# Patient Record
Sex: MALE | Race: WHITE | Employment: OTHER | URBAN - METROPOLITAN AREA
[De-identification: names, ages, dates, MRNs, and addresses within clinical notes are randomized per-mention and may not be internally consistent; named-entity substitution may affect disease eponyms.]

---

## 2018-06-26 ENCOUNTER — OFFICE VISIT (OUTPATIENT)
Dept: PULMONOLOGY | Age: 58
End: 2018-06-26

## 2018-06-26 VITALS
RESPIRATION RATE: 16 BRPM | BODY MASS INDEX: 28.35 KG/M2 | HEART RATE: 87 BPM | OXYGEN SATURATION: 99 % | DIASTOLIC BLOOD PRESSURE: 78 MMHG | HEIGHT: 75 IN | SYSTOLIC BLOOD PRESSURE: 125 MMHG | WEIGHT: 228 LBS | TEMPERATURE: 97.2 F

## 2018-06-26 DIAGNOSIS — G47.10 HYPERSOMNIA: ICD-10-CM

## 2018-06-26 DIAGNOSIS — M26.19 RETROGNATHIA: ICD-10-CM

## 2018-06-26 DIAGNOSIS — G47.33 MODERATE OBSTRUCTIVE SLEEP APNEA: Primary | ICD-10-CM

## 2018-06-26 DIAGNOSIS — R53.83 FATIGUE, UNSPECIFIED TYPE: ICD-10-CM

## 2018-06-26 PROCEDURE — 99203 OFFICE O/P NEW LOW 30 MIN: CPT | Performed by: INTERNAL MEDICINE

## 2018-06-26 ASSESSMENT — SLEEP AND FATIGUE QUESTIONNAIRES
HOW LIKELY ARE YOU TO NOD OFF OR FALL ASLEEP WHILE SITTING QUIETLY AFTER LUNCH WITHOUT ALCOHOL: 0
HOW LIKELY ARE YOU TO NOD OFF OR FALL ASLEEP WHILE LYING DOWN TO REST IN THE AFTERNOON WHEN CIRCUMSTANCES PERMIT: 3
HOW LIKELY ARE YOU TO NOD OFF OR FALL ASLEEP IN A CAR, WHILE STOPPED FOR A FEW MINUTES IN TRAFFIC: 0
HOW LIKELY ARE YOU TO NOD OFF OR FALL ASLEEP WHEN YOU ARE A PASSENGER IN A CAR FOR AN HOUR WITHOUT A BREAK: 0
NECK CIRCUMFERENCE (INCHES): 16.75
HOW LIKELY ARE YOU TO NOD OFF OR FALL ASLEEP WHILE WATCHING TV: 0
HOW LIKELY ARE YOU TO NOD OFF OR FALL ASLEEP WHILE SITTING INACTIVE IN A PUBLIC PLACE: 1
ESS TOTAL SCORE: 4
HOW LIKELY ARE YOU TO NOD OFF OR FALL ASLEEP WHILE SITTING AND READING: 0
HOW LIKELY ARE YOU TO NOD OFF OR FALL ASLEEP WHILE SITTING AND TALKING TO SOMEONE: 0

## 2018-08-14 ENCOUNTER — OFFICE VISIT (OUTPATIENT)
Dept: PULMONOLOGY | Age: 58
End: 2018-08-14

## 2018-08-14 VITALS
HEART RATE: 68 BPM | DIASTOLIC BLOOD PRESSURE: 70 MMHG | SYSTOLIC BLOOD PRESSURE: 108 MMHG | WEIGHT: 223.8 LBS | HEIGHT: 75 IN | BODY MASS INDEX: 27.83 KG/M2 | RESPIRATION RATE: 21 BRPM | OXYGEN SATURATION: 98 %

## 2018-08-14 DIAGNOSIS — M26.19 RETROGNATHIA: ICD-10-CM

## 2018-08-14 DIAGNOSIS — R68.2 DRY MOUTH: ICD-10-CM

## 2018-08-14 DIAGNOSIS — G47.33 MODERATE OBSTRUCTIVE SLEEP APNEA: Primary | ICD-10-CM

## 2018-08-14 PROCEDURE — 99214 OFFICE O/P EST MOD 30 MIN: CPT | Performed by: INTERNAL MEDICINE

## 2018-08-14 ASSESSMENT — SLEEP AND FATIGUE QUESTIONNAIRES
ESS TOTAL SCORE: 1
NECK CIRCUMFERENCE (INCHES): 16
HOW LIKELY ARE YOU TO NOD OFF OR FALL ASLEEP WHILE SITTING INACTIVE IN A PUBLIC PLACE: 0
HOW LIKELY ARE YOU TO NOD OFF OR FALL ASLEEP IN A CAR, WHILE STOPPED FOR A FEW MINUTES IN TRAFFIC: 0
HOW LIKELY ARE YOU TO NOD OFF OR FALL ASLEEP WHEN YOU ARE A PASSENGER IN A CAR FOR AN HOUR WITHOUT A BREAK: 0
HOW LIKELY ARE YOU TO NOD OFF OR FALL ASLEEP WHILE LYING DOWN TO REST IN THE AFTERNOON WHEN CIRCUMSTANCES PERMIT: 1
HOW LIKELY ARE YOU TO NOD OFF OR FALL ASLEEP WHILE SITTING QUIETLY AFTER LUNCH WITHOUT ALCOHOL: 0
HOW LIKELY ARE YOU TO NOD OFF OR FALL ASLEEP WHILE SITTING AND TALKING TO SOMEONE: 0
HOW LIKELY ARE YOU TO NOD OFF OR FALL ASLEEP WHILE SITTING AND READING: 0
HOW LIKELY ARE YOU TO NOD OFF OR FALL ASLEEP WHILE WATCHING TV: 0

## 2018-08-14 NOTE — PATIENT INSTRUCTIONS
enough blankets to stay warm is recommended. If your room is too noisy, try a white noise machine. If too bright, try black out shades or an eye mask. Dont take worries to bed. Leave worries about work, school etc. behind you when you go to bed. Some people find it helpful to assign a worry period in the evening or late afternoon to write down your worries and get them out of your system. CPAP Equipment Cleaning and Disinfecting Schedule  Equipment Cleaning Frequency Instructions  Disinfecting Frequency   Non-Disposable Filters  Weekly Mild soapy water, Rinse, Air Dry Not Required   Disposable Filters Change as needed  2-4 weeks Do Not Wash Not Required   Hose/tubing Daily Mild soapy water, Rinse, Air Dry Once a week   Mask / Nasal Pillows Daily Mild soapy water, Rinse, Air Dry Once a week   Headgear Weekly Hand wash, Mild soapy water, Rinse, Dry  Not Required   Humidifier Daily Empty water daily  Mild soapy water, Rinse well, Air Dry  Once a week   CPAP Unit As Needed Dust with damp cloth,  No detergents or sprays Not Required         Disinfect (per schedule) with 1 part white vinegar and 3 parts water- soak mask and water chamber for 30 minutes every 1-2 weeks, more often if sick. Allow water/vinegar mixture to run through tubing. Allow all equipment to air dry. Drying Hints:   Always hang tubing away from direct sunlight, as this will cause the tubing to become yellow, brittle and crack over a period of time. DO NOT attach the wet tubing to your CPAP unit to blow-dry it. The moisture from the tubing can drain back into your machine. Moisture in your unit can cause sudden pressure increases or short circuits  DO's and DON'Ts:  - Don't use alcohol-based products to clean your mask, because it can cause the materials to become hard and brittle.    - Don't put headgear in the washer or dryer  - Don't use any caustic or household cleaning solutions such as bleach on your CPAP   equipment.  - Do follow the

## 2018-08-14 NOTE — PROGRESS NOTES
· Add chin strap   · Increased humidification   · Advised to use CPAP 6-8 hrs at night and during naps. · Replacement of mask, tubing, head straps every 3-6 months or sooner if damaged. · Follow up CPAP compliance and pressure adjustment if needed  · Sleep hygiene  · Avoid sedatives, alcohol and caffeinated drinks at bed time. · No driving motorized vehicles or operating heavy machinery while fatigue, drowsy or sleepy.

## 2019-07-24 ENCOUNTER — OFFICE VISIT (OUTPATIENT)
Dept: FAMILY MEDICINE CLINIC | Age: 59
End: 2019-07-24
Payer: COMMERCIAL

## 2019-07-24 VITALS
TEMPERATURE: 97.6 F | WEIGHT: 195.2 LBS | HEART RATE: 74 BPM | SYSTOLIC BLOOD PRESSURE: 132 MMHG | OXYGEN SATURATION: 99 % | DIASTOLIC BLOOD PRESSURE: 84 MMHG | BODY MASS INDEX: 24.4 KG/M2

## 2019-07-24 DIAGNOSIS — B02.9 HERPES ZOSTER WITHOUT COMPLICATION: Primary | ICD-10-CM

## 2019-07-24 PROCEDURE — 99203 OFFICE O/P NEW LOW 30 MIN: CPT | Performed by: NURSE PRACTITIONER

## 2019-07-24 RX ORDER — GABAPENTIN 100 MG/1
100 CAPSULE ORAL 3 TIMES DAILY
Qty: 90 CAPSULE | Refills: 0 | Status: SHIPPED | OUTPATIENT
Start: 2019-07-24 | End: 2020-10-23

## 2019-07-24 RX ORDER — VALACYCLOVIR HYDROCHLORIDE 1 G/1
1000 TABLET, FILM COATED ORAL 3 TIMES DAILY
Qty: 21 TABLET | Refills: 0 | Status: SHIPPED | OUTPATIENT
Start: 2019-07-24 | End: 2019-08-01 | Stop reason: SDUPTHER

## 2019-07-24 ASSESSMENT — ENCOUNTER SYMPTOMS
RHINORRHEA: 0
COLOR CHANGE: 1
ROS SKIN COMMENTS: BLISTERS
COUGH: 0
DIARRHEA: 0
EYE DISCHARGE: 0
CHEST TIGHTNESS: 0
SORE THROAT: 0
SHORTNESS OF BREATH: 0

## 2019-07-24 NOTE — PROGRESS NOTES
meetings of clubs or organizations: Not on file     Relationship status: Not on file    Intimate partner violence:     Fear of current or ex partner: Not on file     Emotionally abused: Not on file     Physically abused: Not on file     Forced sexual activity: Not on file   Other Topics Concern    Not on file   Social History Narrative    ** Merged History Encounter **             Family History   Problem Relation Age of Onset    Cancer Mother         breast       Vitals:    07/24/19 1607   BP: 132/84   Pulse: 74   Temp: 97.6 °F (36.4 °C)   TempSrc: Oral   SpO2: 99%   Weight: 195 lb 3.2 oz (88.5 kg)     Estimated body mass index is 24.4 kg/m² as calculated from the following:    Height as of 8/14/18: 6' 3\" (1.905 m). Weight as of this encounter: 195 lb 3.2 oz (88.5 kg). Physical Exam   Constitutional: He is oriented to person, place, and time. He appears well-developed and well-nourished. No distress. HENT:   Head: Normocephalic and atraumatic. Right Ear: External ear normal.   Left Ear: External ear normal.   Eyes: Pupils are equal, round, and reactive to light. Conjunctivae are normal. Right eye exhibits no discharge. Left eye exhibits no discharge. Neck: Normal range of motion. Neck supple. Cardiovascular: Normal rate, regular rhythm and normal heart sounds. Pulmonary/Chest: Effort normal and breath sounds normal. No respiratory distress. Abdominal: Soft. Musculoskeletal: Normal range of motion. He exhibits no deformity. Neurological: He is alert and oriented to person, place, and time. Skin: Skin is warm and dry. He is not diaphoretic. There is erythema. Erythema, fluid filled pustules on left side of face, neck, head   Psychiatric: He has a normal mood and affect. His behavior is normal. Judgment and thought content normal.   Vitals reviewed. ASSESSMENT/PLAN:  [place] was seen today for herpes zoster.     Diagnoses and all orders for this visit:    Herpes zoster without

## 2019-08-01 DIAGNOSIS — B02.9 HERPES ZOSTER WITHOUT COMPLICATION: ICD-10-CM

## 2019-08-02 RX ORDER — VALACYCLOVIR HYDROCHLORIDE 1 G/1
TABLET, FILM COATED ORAL
Qty: 21 TABLET | Refills: 0 | Status: SHIPPED | OUTPATIENT
Start: 2019-08-02 | End: 2020-10-23

## 2019-08-08 ENCOUNTER — TELEPHONE (OUTPATIENT)
Dept: FAMILY MEDICINE CLINIC | Age: 59
End: 2019-08-08

## 2019-08-08 ENCOUNTER — OFFICE VISIT (OUTPATIENT)
Dept: FAMILY MEDICINE CLINIC | Age: 59
End: 2019-08-08
Payer: COMMERCIAL

## 2019-08-08 VITALS
WEIGHT: 195.8 LBS | OXYGEN SATURATION: 98 % | TEMPERATURE: 97.9 F | RESPIRATION RATE: 16 BRPM | DIASTOLIC BLOOD PRESSURE: 88 MMHG | SYSTOLIC BLOOD PRESSURE: 132 MMHG | HEIGHT: 75 IN | HEART RATE: 76 BPM | BODY MASS INDEX: 24.34 KG/M2

## 2019-08-08 DIAGNOSIS — H61.22 IMPACTED CERUMEN OF LEFT EAR: ICD-10-CM

## 2019-08-08 DIAGNOSIS — Z00.00 HEALTHCARE MAINTENANCE: Primary | ICD-10-CM

## 2019-08-08 PROCEDURE — 99396 PREV VISIT EST AGE 40-64: CPT | Performed by: FAMILY MEDICINE

## 2019-08-08 ASSESSMENT — PATIENT HEALTH QUESTIONNAIRE - PHQ9
1. LITTLE INTEREST OR PLEASURE IN DOING THINGS: 0
SUM OF ALL RESPONSES TO PHQ QUESTIONS 1-9: 0
SUM OF ALL RESPONSES TO PHQ QUESTIONS 1-9: 0
SUM OF ALL RESPONSES TO PHQ9 QUESTIONS 1 & 2: 0
2. FEELING DOWN, DEPRESSED OR HOPELESS: 0

## 2019-08-26 ENCOUNTER — NURSE ONLY (OUTPATIENT)
Dept: FAMILY MEDICINE CLINIC | Age: 59
End: 2019-08-26
Payer: COMMERCIAL

## 2019-08-26 DIAGNOSIS — Z00.00 HEALTHCARE MAINTENANCE: ICD-10-CM

## 2019-08-26 LAB
ANION GAP SERPL CALCULATED.3IONS-SCNC: 13 MMOL/L (ref 3–16)
BUN BLDV-MCNC: 18 MG/DL (ref 7–20)
CALCIUM SERPL-MCNC: 9.4 MG/DL (ref 8.3–10.6)
CHLORIDE BLD-SCNC: 102 MMOL/L (ref 99–110)
CHOLESTEROL, TOTAL: 156 MG/DL (ref 0–199)
CO2: 27 MMOL/L (ref 21–32)
CREAT SERPL-MCNC: 0.9 MG/DL (ref 0.9–1.3)
GFR AFRICAN AMERICAN: >60
GFR NON-AFRICAN AMERICAN: >60
GLUCOSE BLD-MCNC: 89 MG/DL (ref 70–99)
HDLC SERPL-MCNC: 79 MG/DL (ref 40–60)
LDL CHOLESTEROL CALCULATED: 68 MG/DL
POTASSIUM SERPL-SCNC: 4.5 MMOL/L (ref 3.5–5.1)
SODIUM BLD-SCNC: 142 MMOL/L (ref 136–145)
TRIGL SERPL-MCNC: 44 MG/DL (ref 0–150)
VLDLC SERPL CALC-MCNC: 9 MG/DL

## 2019-08-26 PROCEDURE — 36415 COLL VENOUS BLD VENIPUNCTURE: CPT | Performed by: FAMILY MEDICINE

## 2019-08-27 LAB
ESTIMATED AVERAGE GLUCOSE: 96.8 MG/DL
HBA1C MFR BLD: 5 %

## 2019-09-25 ENCOUNTER — TELEPHONE (OUTPATIENT)
Dept: PULMONOLOGY | Age: 59
End: 2019-09-25

## 2019-11-05 ENCOUNTER — OFFICE VISIT (OUTPATIENT)
Dept: ORTHOPEDIC SURGERY | Age: 59
End: 2019-11-05
Payer: COMMERCIAL

## 2019-11-05 VITALS — HEIGHT: 75 IN | WEIGHT: 195.77 LBS | BODY MASS INDEX: 24.34 KG/M2

## 2019-11-05 DIAGNOSIS — R52 PAIN: Primary | ICD-10-CM

## 2019-11-05 PROCEDURE — 99243 OFF/OP CNSLTJ NEW/EST LOW 30: CPT | Performed by: ORTHOPAEDIC SURGERY

## 2019-11-05 RX ORDER — PREDNISONE 10 MG/1
TABLET ORAL
Qty: 14 TABLET | Refills: 0 | Status: SHIPPED | OUTPATIENT
Start: 2019-11-05 | End: 2020-10-23

## 2019-11-08 ENCOUNTER — PATIENT MESSAGE (OUTPATIENT)
Dept: ORTHOPEDIC SURGERY | Age: 59
End: 2019-11-08

## 2019-12-03 ENCOUNTER — OFFICE VISIT (OUTPATIENT)
Dept: ORTHOPEDIC SURGERY | Age: 59
End: 2019-12-03
Payer: COMMERCIAL

## 2019-12-03 VITALS — BODY MASS INDEX: 24.34 KG/M2 | WEIGHT: 195.77 LBS | HEIGHT: 75 IN

## 2019-12-03 DIAGNOSIS — M79.672 LEFT FOOT PAIN: ICD-10-CM

## 2019-12-03 DIAGNOSIS — R52 PAIN: Primary | ICD-10-CM

## 2019-12-03 PROCEDURE — 99212 OFFICE O/P EST SF 10 MIN: CPT | Performed by: ORTHOPAEDIC SURGERY

## 2019-12-04 ENCOUNTER — TELEPHONE (OUTPATIENT)
Dept: ORTHOPEDIC SURGERY | Age: 59
End: 2019-12-04

## 2019-12-09 ENCOUNTER — TELEPHONE (OUTPATIENT)
Dept: ORTHOPEDIC SURGERY | Age: 59
End: 2019-12-09

## 2019-12-09 DIAGNOSIS — M79.672 LEFT FOOT PAIN: ICD-10-CM

## 2019-12-09 PROCEDURE — L4397 STATIC OR DYNAMI AFO PRE OTS: HCPCS | Performed by: ORTHOPAEDIC SURGERY

## 2019-12-10 ENCOUNTER — TELEPHONE (OUTPATIENT)
Dept: ORTHOPEDIC SURGERY | Age: 59
End: 2019-12-10

## 2019-12-19 ENCOUNTER — OFFICE VISIT (OUTPATIENT)
Dept: ORTHOPEDIC SURGERY | Age: 59
End: 2019-12-19
Payer: COMMERCIAL

## 2019-12-19 VITALS — WEIGHT: 195.77 LBS | BODY MASS INDEX: 24.34 KG/M2 | HEIGHT: 75 IN

## 2019-12-19 DIAGNOSIS — M72.2 PLANTAR FASCIITIS OF LEFT FOOT: Primary | ICD-10-CM

## 2019-12-19 PROCEDURE — 99212 OFFICE O/P EST SF 10 MIN: CPT | Performed by: ORTHOPAEDIC SURGERY

## 2019-12-19 RX ORDER — PREDNISONE 10 MG/1
TABLET ORAL
Qty: 14 TABLET | Refills: 0 | Status: SHIPPED | OUTPATIENT
Start: 2019-12-19 | End: 2020-10-30

## 2020-01-22 ENCOUNTER — OFFICE VISIT (OUTPATIENT)
Dept: ORTHOPEDIC SURGERY | Age: 60
End: 2020-01-22
Payer: COMMERCIAL

## 2020-01-22 VITALS — WEIGHT: 195.77 LBS | HEIGHT: 75 IN | BODY MASS INDEX: 24.34 KG/M2

## 2020-01-22 PROCEDURE — 20605 DRAIN/INJ JOINT/BURSA W/O US: CPT | Performed by: ORTHOPAEDIC SURGERY

## 2020-01-22 PROCEDURE — 99212 OFFICE O/P EST SF 10 MIN: CPT | Performed by: ORTHOPAEDIC SURGERY

## 2020-01-22 RX ORDER — TRIAMCINOLONE ACETONIDE 40 MG/ML
80 INJECTION, SUSPENSION INTRA-ARTICULAR; INTRAMUSCULAR ONCE
Status: COMPLETED | OUTPATIENT
Start: 2020-01-22 | End: 2020-01-22

## 2020-01-22 RX ADMIN — TRIAMCINOLONE ACETONIDE 80 MG: 40 INJECTION, SUSPENSION INTRA-ARTICULAR; INTRAMUSCULAR at 09:32

## 2020-01-22 NOTE — PROGRESS NOTES
Subjective: Patient is here for follow-up of his left foot plantar fasciitis. I last saw him in December 19, 2019 and he had a MRI scan done which showed severe plantar fasciitis with calcaneal stress reaction. At that time his pain was minimal but he states that it is become increasingly more painful. Rates his pain at 4 out of 10. He wrecked his last lacrosse game about a week and half to 2 weeks ago. States activity makes it worse not activity makes it better. He would like to discuss non-mainstream treatments and asked about dry needling. He would also like an injection today. Objective: Physical exam shows tenderness throughout the mid arch and plantar medial heel of the left foot. Tight gastrocs and hamstrings strength is 5/5 no instability negative Tinel's to the tarsal tunnel. Imaging: I went through the MRI with the patient and showed him the calcaneal stress reaction and area of plantar fasciitis. I answered all of his questions about this MRI scan  Assessment and plan: We discussed operative and nonoperative treatments. He understands that surgery is not a great option. We discussed dry needling, graston, cold laser, Tenex, extracorporal shockwave, PRP and acupuncture. I went ahead and injected his left plantar medial heel with Marcaine lidocaine and Kenalog 4/4/2 cc for plantar fasciitis and he tolerated this well.   He will limit his activities for the next 2 weeks continue his inserts take anti-inflammatories as needed and follow-up with me as needed

## 2020-10-23 ENCOUNTER — OFFICE VISIT (OUTPATIENT)
Dept: FAMILY MEDICINE CLINIC | Age: 60
End: 2020-10-23
Payer: COMMERCIAL

## 2020-10-23 VITALS
WEIGHT: 222 LBS | OXYGEN SATURATION: 98 % | DIASTOLIC BLOOD PRESSURE: 80 MMHG | SYSTOLIC BLOOD PRESSURE: 140 MMHG | BODY MASS INDEX: 27.89 KG/M2 | TEMPERATURE: 97.3 F | HEART RATE: 75 BPM

## 2020-10-23 LAB
A/G RATIO: 2 (ref 1.1–2.2)
ALBUMIN SERPL-MCNC: 4.3 G/DL (ref 3.4–5)
ALP BLD-CCNC: 89 U/L (ref 40–129)
ALT SERPL-CCNC: 16 U/L (ref 10–40)
ANION GAP SERPL CALCULATED.3IONS-SCNC: 13 MMOL/L (ref 3–16)
AST SERPL-CCNC: 21 U/L (ref 15–37)
BILIRUB SERPL-MCNC: 1.1 MG/DL (ref 0–1)
BUN BLDV-MCNC: 22 MG/DL (ref 7–20)
C-REACTIVE PROTEIN: 1.5 MG/L (ref 0–5.1)
CALCIUM SERPL-MCNC: 9 MG/DL (ref 8.3–10.6)
CHLORIDE BLD-SCNC: 99 MMOL/L (ref 99–110)
CHOLESTEROL, TOTAL: 153 MG/DL (ref 0–199)
CO2: 25 MMOL/L (ref 21–32)
CREAT SERPL-MCNC: 1.2 MG/DL (ref 0.8–1.3)
GFR AFRICAN AMERICAN: >60
GFR NON-AFRICAN AMERICAN: >60
GLOBULIN: 2.2 G/DL
GLUCOSE BLD-MCNC: 118 MG/DL (ref 70–99)
HCT VFR BLD CALC: 46 % (ref 40.5–52.5)
HDLC SERPL-MCNC: 60 MG/DL (ref 40–60)
HEMOGLOBIN: 15.4 G/DL (ref 13.5–17.5)
IGA: 328 MG/DL (ref 70–400)
LDL CHOLESTEROL CALCULATED: 62 MG/DL
MCH RBC QN AUTO: 27.8 PG (ref 26–34)
MCHC RBC AUTO-ENTMCNC: 33.6 G/DL (ref 31–36)
MCV RBC AUTO: 82.8 FL (ref 80–100)
PDW BLD-RTO: 14 % (ref 12.4–15.4)
PLATELET # BLD: 192 K/UL (ref 135–450)
PMV BLD AUTO: 8.5 FL (ref 5–10.5)
POTASSIUM SERPL-SCNC: 3.9 MMOL/L (ref 3.5–5.1)
RBC # BLD: 5.56 M/UL (ref 4.2–5.9)
SODIUM BLD-SCNC: 137 MMOL/L (ref 136–145)
TOTAL PROTEIN: 6.5 G/DL (ref 6.4–8.2)
TRIGL SERPL-MCNC: 157 MG/DL (ref 0–150)
VITAMIN D 25-HYDROXY: 35.4 NG/ML
VLDLC SERPL CALC-MCNC: 31 MG/DL
WBC # BLD: 8.7 K/UL (ref 4–11)

## 2020-10-23 PROCEDURE — 99396 PREV VISIT EST AGE 40-64: CPT | Performed by: FAMILY MEDICINE

## 2020-10-23 PROCEDURE — 36415 COLL VENOUS BLD VENIPUNCTURE: CPT | Performed by: FAMILY MEDICINE

## 2020-10-23 RX ORDER — AMLODIPINE BESYLATE 10 MG/1
10 TABLET ORAL DAILY
COMMUNITY

## 2020-10-23 RX ORDER — POLYETHYLENE GLYCOL 3350 17 G/17G
17 POWDER, FOR SOLUTION ORAL DAILY
Qty: 225 G | Refills: 2 | Status: SHIPPED | OUTPATIENT
Start: 2020-10-23 | End: 2020-11-22

## 2020-10-23 RX ORDER — HYDROCHLOROTHIAZIDE 25 MG/1
TABLET ORAL
COMMUNITY
Start: 2020-10-20

## 2020-10-23 ASSESSMENT — PATIENT HEALTH QUESTIONNAIRE - PHQ9
1. LITTLE INTEREST OR PLEASURE IN DOING THINGS: 0
SUM OF ALL RESPONSES TO PHQ QUESTIONS 1-9: 0
SUM OF ALL RESPONSES TO PHQ9 QUESTIONS 1 & 2: 0
SUM OF ALL RESPONSES TO PHQ QUESTIONS 1-9: 0
SUM OF ALL RESPONSES TO PHQ QUESTIONS 1-9: 0
2. FEELING DOWN, DEPRESSED OR HOPELESS: 0

## 2020-10-23 NOTE — PROGRESS NOTES
10/23/2020    Alton Salgado III (:  1960) is a60 y.o. male, here for a preventive medicine evaluation. Other issues:  Abdominal bloating - associated with constipation, has had two small bowel movements today  Started 8-9 days ago  Location: both lower quadrants  No blood in stool    Had normal colonoscopy in     Patient Active Problem List   Diagnosis    HTN (hypertension)    Wide-complex tachycardia ([address]    [facility]    Chest pain    NSVT (nonsustained ventricular tachycardia) (HCC)    Paroxysmal atrial fibrillation ([facility]    S/P ablation of atrial fibrillation    Hypokalemia    Ventral hernia       Prior to Visit Medications    Medication Sig Taking? Authorizing Provider   amLODIPine (NORVASC) 10 MG tablet Take 10 mg by mouth daily Yes Historical Provider, MD   hydroCHLOROthiazide (HYDRODIURIL) 25 MG tablet  Yes Historical Provider, MD   polyethylene glycol (GLYCOLAX) 17 GM/SCOOP powder Take 17 g by mouth daily Yes Itzel Rosales MD   apixaban (ELIQUIS) 5 MG TABS tablet TAKE ONE TABLET BY MOUTH TWICE A DAY Yes Historical Provider, MD   carvedilol (COREG) 12.5 MG tablet Take 12.5 mg by mouth 2 times daily (with meals). Yes Historical Provider, MD   minocycline (MINOCIN;DYNACIN) 100 MG capsule Take 100 mg by mouth daily. Yes Historical Provider, MD   lisinopril (PRINIVIL;ZESTRIL) 40 MG tablet Take 40 mg by mouth daily. Yes Historical Provider, MD   predniSONE (DELTASONE) 10 MG tablet 2 TAB BID FOR 2 DAYS, 1 TAB BID 2 DAYS , 1 TAB QD 2 DAYS.   Patient not taking: Reported on 10/23/2020  Mike Bravo MD   predniSONE (DELTASONE) 10 MG tablet Take 2 bid for 2 days, then 1 bid for 2 days, then 1 qd for 2 days  Mike Bravo MD   valACYclovir (VALTREX) 1 g tablet TAKE ONE TABLET BY MOUTH THREE TIMES A DAY FOR 7 DAYS  Patient not taking: Reported on 2019  Johny Espinoza APRN - CNP   gabapentin (NEURONTIN) 100 MG capsule Take 1 capsule by mouth 3 times daily for 30 days. Intended supply: 90 days  OFE Keane - CNP   esomeprazole (NEXIUM) 40 MG capsule Take 1 capsule by mouth daily.   Trevor Yin MD        Allergies   Allergen Reactions    Amoxicillin     Amoxicillin     Penicillins        Past Medical History:   Diagnosis Date    Atrial fibrillation ([address]     Atrial fibrillation (HCC)     CAD (coronary artery disease)     ablation, at fib    Chicken pox     Hypertension     ART (obstructive sleep apnea)     Sleep apnea     wears a cpap       Past Surgical History:   Procedure Laterality Date    ABLATION OF DYSRHYTHMIC FOCUS      CARDIAC SURGERY      stent    COLONOSCOPY  4/13/2015    Diverticulosis    CYST INCISION AND DRAINAGE      MRSA infection    UPPER GASTROINTESTINAL ENDOSCOPY  8/22/2012       Social History     Socioeconomic History    Marital status: Single     Spouse name: Not on file    Number of children: Not on file    Years of education: Not on file    Highest education level: Not on file   Occupational History    Not on file   Social Needs    Financial resource strain: Not on file    Food insecurity     Worry: Not on file     Inability: Not on file    Transportation needs     Medical: Not on file     Non-medical: Not on file   Tobacco Use    Smoking status: Never Smoker    Smokeless tobacco: Never Used   Substance and Sexual Activity    Alcohol use: No     Comment: 1/mthh    Drug use: No    Sexual activity: Never   Lifestyle    Physical activity     Days per week: Not on file     Minutes per session: Not on file    Stress: Not on file   Relationships    Social connections     Talks on phone: Not on file     Gets together: Not on file     Attends Hoah[employer] service: Not on file     Active member of club or organization: Not on file     Attends meetings of clubs or organizations: Not on file     Relationship status: Not on file    Intimate partner violence     Fear of current or ex partner: Not on file     Emotionally abused: Not on file     Physically abused: Not on file     Forced sexual activity: Not on file   Other Topics Concern    Not on file   Social History Narrative    ** Merged History Encounter **             Family History   Problem Relation Age of Onset    Cancer Mother         breast       ADVANCE DIRECTIVE: N, <no information>    Vitals:    10/23/20 1554   BP: (!) 140/80   Pulse: 75   Temp: 97.3 °F (36.3 °C)   TempSrc: Temporal   SpO2: 98%   Weight: 222 lb (100.7 kg)     Estimated body mass index is 27.89 kg/m² as calculated from the following:    Height as of 1/22/20: 6' 2.8\" (1.9 m). Weight as of this encounter: 222 lb (100.7 kg). Physical Exam  Constitutional: Patient appears well-developed and well-nourished   Ears, Nose, Mouth & Throat: external inspection of ears and nose show no scars, lesions or masses, TM's normal bilaterally  Neck: neck is supple. No thyromegaly present   Cardiovascular: Normal rate. No lower ext edema present  Respiratory: Effort normal and breath sounds normal. No respiratory distress. No W/R/R  Gastrointestinal: S/NT/ND, +BS  Musculoskeletal: Normal range of motion of extremities, normal gait  Psychiatric: judgment and insight appropriate for age, no agitation  Skin: Skin is warm and dry     No flowsheet data found.     Lab Results   Component Value Date    CHOL 156 08/26/2019    CHOL 168 10/10/2016    CHOL 171 03/26/2015    TRIG 44 08/26/2019    TRIG 194 10/10/2016    TRIG 77 03/26/2015    HDL 79 08/26/2019    HDL 44 10/10/2016    HDL 63 03/26/2015    HDL 41 03/27/2011    HDL 58 03/05/2010    LDLCALC 68 08/26/2019    LDLCALC 85 10/10/2016    LDLCALC 93 03/26/2015    GLUCOSE 89 08/26/2019    LABA1C 5.0 08/26/2019       The 10-year ASCVD risk score (Abdulaziz Lauren et al., 2013) is: 6.7%    Values used to calculate the score:      Age: 61 years      Sex: Male      Is Non- : No      Diabetic: No      Tobacco smoker: No      Systolic Blood Pressure: 771 mmHg

## 2020-10-24 LAB
ESTIMATED AVERAGE GLUCOSE: 108.3 MG/DL
HBA1C MFR BLD: 5.4 %
TISSUE TRANSGLUTAMINASE IGA: <0.5 U/ML (ref 0–14)

## 2020-10-30 ENCOUNTER — OFFICE VISIT (OUTPATIENT)
Dept: FAMILY MEDICINE CLINIC | Age: 60
End: 2020-10-30
Payer: COMMERCIAL

## 2020-10-30 VITALS
SYSTOLIC BLOOD PRESSURE: 130 MMHG | WEIGHT: 224 LBS | TEMPERATURE: 97.7 F | BODY MASS INDEX: 28.15 KG/M2 | DIASTOLIC BLOOD PRESSURE: 86 MMHG | OXYGEN SATURATION: 99 % | HEART RATE: 65 BPM

## 2020-10-30 PROCEDURE — 99214 OFFICE O/P EST MOD 30 MIN: CPT | Performed by: FAMILY MEDICINE

## 2020-10-30 ASSESSMENT — ENCOUNTER SYMPTOMS
BLOOD IN STOOL: 0
CONSTIPATION: 0

## 2020-10-30 NOTE — PROGRESS NOTES
10/30/2020     Dionne Snellen III (:  )QT a 61 y.o. male, here for evaluation of the following medical concerns:    CC: bloating    HPI     Bloating  Improved but still present, Has been working on cutting out FODMAPs, recently had a fairly normal bowel movement, Didn't use miralax  normal colonoscopy in     HTN  Controlled, taking bp meds as prescribed    Paroxysmal A fib  Taking eliquis    Review of Systems   Constitutional: Negative for fever. Gastrointestinal: Negative for blood in stool and constipation. Prior to Visit Medications    Medication Sig Taking? Authorizing Provider   amLODIPine (NORVASC) 10 MG tablet Take 10 mg by mouth daily Yes Historical Provider, MD   hydroCHLOROthiazide (HYDRODIURIL) 25 MG tablet  Yes Historical Provider, MD   polyethylene glycol (GLYCOLAX) 17 GM/SCOOP powder Take 17 g by mouth daily Yes Eleanor Keita MD   apixaban (ELIQUIS) 5 MG TABS tablet TAKE ONE TABLET BY MOUTH TWICE A DAY Yes Historical Provider, MD   carvedilol (COREG) 12.5 MG tablet Take 12.5 mg by mouth 2 times daily (with meals). Yes Historical Provider, MD   minocycline (MINOCIN;DYNACIN) 100 MG capsule Take 100 mg by mouth daily. Yes Historical Provider, MD   lisinopril (PRINIVIL;ZESTRIL) 40 MG tablet Take 40 mg by mouth daily. Yes Historical Provider, MD        Social History     Tobacco Use    Smoking status: Never Smoker    Smokeless tobacco: Never Used   Substance Use Topics    Alcohol use: No     Comment:         Vitals:    10/30/20 0959   BP: 130/86   Pulse: 65   Temp: 97.7 °F (36.5 °C)   SpO2: 99%   Weight: 224 lb (101.6 kg)     Estimated body mass index is 28.15 kg/m² as calculated from the following:    Height as of 20: 6' 2.8\" (1.9 m). Weight as of this encounter: 224 lb (101.6 kg). Physical Exam  Constitutional:  Appears well-developed and well-nourished. No apparent distress    Mental status: Alert and awake.  Able to follow commands  Eyes: normal EOM, normal sclera, no discharge visible  HENT: Normocephalic, atraumatic  External Ears: Normal  Neck: No visualized mass   Pulmonary/Chest:  Respiratory effort normal. No visualized signs of difficulty breathing or respiratory distress  Musculoskeletal: Normal range of motion of neck  Neurological: No Facial Asymmetry  Abd: + BS, S/NT/ND    ASSESSMENT/PLAN:  Sarah Batres was seen today for medication check. Diagnoses and all orders for this visit:    Bloating  Suspect IBS  - OTC metamucil  - pt scheduled to see GI next week    Hypertension, unspecified type  Controlled, continue taking bp medications as prescribed    Paroxysmal atrial fibrillation ([address]  Controlled, continue taking eliquis as prescribed    Return in about 6 months (around 4/30/2021). An electronic signature was used to authenticate this note.     --Shreyas Valdes MD on 10/30/2020 at 11:04 AM

## 2021-04-10 ENCOUNTER — APPOINTMENT (OUTPATIENT)
Dept: CT IMAGING | Age: 61
End: 2021-04-10
Payer: COMMERCIAL

## 2021-04-10 ENCOUNTER — HOSPITAL ENCOUNTER (EMERGENCY)
Age: 61
Discharge: HOME | End: 2021-04-10
Payer: COMMERCIAL

## 2021-04-10 VITALS
OXYGEN SATURATION: 98 % | TEMPERATURE: 98.2 F | DIASTOLIC BLOOD PRESSURE: 96 MMHG | BODY MASS INDEX: 28.35 KG/M2 | WEIGHT: 228 LBS | HEART RATE: 64 BPM | RESPIRATION RATE: 20 BRPM | HEIGHT: 75 IN | SYSTOLIC BLOOD PRESSURE: 150 MMHG

## 2021-04-10 DIAGNOSIS — K92.1 BLOOD IN STOOL: ICD-10-CM

## 2021-04-10 DIAGNOSIS — K52.9 COLITIS: Primary | ICD-10-CM

## 2021-04-10 LAB
A/G RATIO: 1.8 (ref 1.1–2.2)
ALBUMIN SERPL-MCNC: 4.6 G/DL (ref 3.4–5)
ALP BLD-CCNC: 63 U/L (ref 40–129)
ALT SERPL-CCNC: 17 U/L (ref 10–40)
ANION GAP SERPL CALCULATED.3IONS-SCNC: 8 MMOL/L (ref 3–16)
AST SERPL-CCNC: 20 U/L (ref 15–37)
BASOPHILS ABSOLUTE: 0.1 K/UL (ref 0–0.2)
BASOPHILS RELATIVE PERCENT: 1.3 %
BILIRUB SERPL-MCNC: 1.2 MG/DL (ref 0–1)
BUN BLDV-MCNC: 19 MG/DL (ref 7–20)
CALCIUM SERPL-MCNC: 9.6 MG/DL (ref 8.3–10.6)
CHLORIDE BLD-SCNC: 104 MMOL/L (ref 99–110)
CO2: 28 MMOL/L (ref 21–32)
CREAT SERPL-MCNC: 1 MG/DL (ref 0.8–1.3)
EOSINOPHILS ABSOLUTE: 0.2 K/UL (ref 0–0.6)
EOSINOPHILS RELATIVE PERCENT: 2.5 %
GFR AFRICAN AMERICAN: >60
GFR NON-AFRICAN AMERICAN: >60
GLOBULIN: 2.6 G/DL
GLUCOSE BLD-MCNC: 107 MG/DL (ref 70–99)
HCT VFR BLD CALC: 41.8 % (ref 40.5–52.5)
HEMOGLOBIN: 14.7 G/DL (ref 13.5–17.5)
LACTIC ACID: 0.7 MMOL/L (ref 0.4–2)
LIPASE: 24 U/L (ref 13–60)
LYMPHOCYTES ABSOLUTE: 1.4 K/UL (ref 1–5.1)
LYMPHOCYTES RELATIVE PERCENT: 20 %
MCH RBC QN AUTO: 29.5 PG (ref 26–34)
MCHC RBC AUTO-ENTMCNC: 35.1 G/DL (ref 31–36)
MCV RBC AUTO: 84 FL (ref 80–100)
MONOCYTES ABSOLUTE: 0.6 K/UL (ref 0–1.3)
MONOCYTES RELATIVE PERCENT: 8.4 %
NEUTROPHILS ABSOLUTE: 4.8 K/UL (ref 1.7–7.7)
NEUTROPHILS RELATIVE PERCENT: 67.8 %
PDW BLD-RTO: 12.8 % (ref 12.4–15.4)
PLATELET # BLD: 160 K/UL (ref 135–450)
PMV BLD AUTO: 7.6 FL (ref 5–10.5)
POTASSIUM SERPL-SCNC: 4.2 MMOL/L (ref 3.5–5.1)
RBC # BLD: 4.97 M/UL (ref 4.2–5.9)
SODIUM BLD-SCNC: 140 MMOL/L (ref 136–145)
TOTAL PROTEIN: 7.2 G/DL (ref 6.4–8.2)
WBC # BLD: 7 K/UL (ref 4–11)

## 2021-04-10 PROCEDURE — 83690 ASSAY OF LIPASE: CPT

## 2021-04-10 PROCEDURE — 6370000000 HC RX 637 (ALT 250 FOR IP): Performed by: NURSE PRACTITIONER

## 2021-04-10 PROCEDURE — 85025 COMPLETE CBC W/AUTO DIFF WBC: CPT

## 2021-04-10 PROCEDURE — 80053 COMPREHEN METABOLIC PANEL: CPT

## 2021-04-10 PROCEDURE — 74177 CT ABD & PELVIS W/CONTRAST: CPT

## 2021-04-10 PROCEDURE — 6360000004 HC RX CONTRAST MEDICATION: Performed by: NURSE PRACTITIONER

## 2021-04-10 PROCEDURE — 83605 ASSAY OF LACTIC ACID: CPT

## 2021-04-10 PROCEDURE — 99284 EMERGENCY DEPT VISIT MOD MDM: CPT

## 2021-04-10 RX ORDER — METRONIDAZOLE 250 MG/1
500 TABLET ORAL ONCE
Status: COMPLETED | OUTPATIENT
Start: 2021-04-10 | End: 2021-04-10

## 2021-04-10 RX ORDER — CIPROFLOXACIN 500 MG/1
500 TABLET, FILM COATED ORAL 2 TIMES DAILY
Qty: 20 TABLET | Refills: 0 | Status: SHIPPED | OUTPATIENT
Start: 2021-04-10 | End: 2021-04-20

## 2021-04-10 RX ORDER — CIPROFLOXACIN 500 MG/1
500 TABLET, FILM COATED ORAL ONCE
Status: COMPLETED | OUTPATIENT
Start: 2021-04-10 | End: 2021-04-10

## 2021-04-10 RX ORDER — METRONIDAZOLE 500 MG/1
500 TABLET ORAL 3 TIMES DAILY
Qty: 30 TABLET | Refills: 0 | Status: SHIPPED | OUTPATIENT
Start: 2021-04-10 | End: 2021-04-13

## 2021-04-10 RX ADMIN — METRONIDAZOLE 500 MG: 250 TABLET ORAL at 15:37

## 2021-04-10 RX ADMIN — CIPROFLOXACIN 500 MG: 500 TABLET, FILM COATED ORAL at 15:37

## 2021-04-10 RX ADMIN — IOPAMIDOL 75 ML: 755 INJECTION, SOLUTION INTRAVENOUS at 15:12

## 2021-04-10 ASSESSMENT — PAIN DESCRIPTION - LOCATION: LOCATION: ABDOMEN

## 2021-04-10 ASSESSMENT — PAIN SCALES - GENERAL
PAINLEVEL_OUTOF10: 3
PAINLEVEL_OUTOF10: 3

## 2021-04-10 NOTE — ED PROVIDER NOTES
[facility] Emergency Department    CHIEF COMPLAINT  Abdominal Pain (began with abd. bloating, pt unable to have bowel movement without difficulty. + pain prior to passing stool.  + blood in stool wednesday. pt called GI, was told to come in if it got worse. )      [address] III is a 61 y.o. male with a pertinent history of atrial fibrillation who presents to the ED complaining of lower abdominal pain, intermittent constipation and diarrhea, blood in stool on Wednesday and Thursday that has since resolved. Patient reports pain has continued to worsen throughout the week and weekend. Patient did call his gastroenterologist who instructed him to come to the emergency department. Patient denies fever, chills, body aches, dizziness, syncope, chest pain, shortness of breath, nausea, vomiting. Patient is on anticoagulation for history of atrial fibrillation. No other complaints, modifying factors or associated symptoms. Nursing notes reviewed.    Past Medical History:   Diagnosis Date    Atrial fibrillation ([address]     Atrial fibrillation (HCC)     CAD (coronary artery disease)     ablation, at fib    Chicken pox     Hypertension     ART (obstructive sleep apnea)     Sleep apnea     wears a cpap     Past Surgical History:   Procedure Laterality Date    ABLATION OF DYSRHYTHMIC FOCUS      CARDIAC SURGERY      stent    COLONOSCOPY  4/13/2015    Diverticulosis    CYST INCISION AND DRAINAGE      MRSA infection    UPPER GASTROINTESTINAL ENDOSCOPY  8/22/2012     Family History   Problem Relation Age of Onset    Cancer Mother         breast     Social History     Socioeconomic History    Marital status: Single     Spouse name: Not on file    Number of children: Not on file    Years of education: Not on file    Highest education level: Not on file   Occupational History    Not on file   Social Needs    Financial resource strain: Not on file SYSTEMS  10 systems reviewed, pertinent positives per HPI otherwise noted to be negative    PHYSICAL EXAM  BP (!) 150/96   Pulse 64   Temp 98.2 °F (36.8 °C) (Oral)   Resp 20   Ht 6' 2.8\" (1.9 m)   Wt 228 lb (103.4 kg)   SpO2 98%   BMI 28.65 kg/m²   GENERAL APPEARANCE: Awake and alert. Cooperative. No acute distress. Vital signs are stable. Well appearing and non toxic. HEAD: Normocephalic. Atraumatic. EYES: PERRL. EOM's grossly intact. ENT: Mucous membranes are moist.   NECK: Supple. Normal ROM. HEART: RRR. Distal pulses are equal and intact. Cap refill less than 2 seconds. LUNGS: Respirations unlabored. CTAB. Good air exchange. Speaking comfortably in full sentences. No wheezing, rhonchi, rales, stridor. ABDOMEN: Soft. Non-distended. Non-tender. No guarding or rebound. No rigidity. Bowel sounds are present. Negative hernandez's. Negative McBurney's point. Negative CVA tenderness. EXTREMITIES: No peripheral edema. Moves all extremities equally. All extremities neurovascularly intact. SKIN: Warm and dry. No acute rashes. NEUROLOGICAL: Alert and oriented. No gross facial drooping. Strength 5/5, sensation intact. PSYCHIATRIC: Normal mood and affect. SCREENINGS       RADIOLOGY  Ct Abdomen Pelvis W Iv Contrast Additional Contrast? None    Result Date: 4/10/2021  EXAMINATION: CT OF THE ABDOMEN AND PELVIS WITH CONTRAST 4/10/2021 3:04 pm TECHNIQUE: CT of the abdomen and pelvis was performed with the administration of intravenous contrast. Multiplanar reformatted images are provided for review. Dose modulation, iterative reconstruction, and/or weight based adjustment of the mA/kV was utilized to reduce the radiation dose to as low as reasonably achievable.  COMPARISON: 04/02/2015 HISTORY: ORDERING SYSTEM PROVIDED HISTORY: lower abd pain constipation, bloody stool wed and thurs TECHNOLOGIST PROVIDED HISTORY: Reason for exam:->lower abd pain constipation, bloody stool wed and thurs Additional 12.8 12.4 - 15.4 %    Platelets 411 748 - 893 K/uL    MPV 7.6 5.0 - 10.5 fL    Neutrophils % 67.8 %    Lymphocytes % 20.0 %    Monocytes % 8.4 %    Eosinophils % 2.5 %    Basophils % 1.3 %    Neutrophils Absolute 4.8 1.7 - 7.7 K/uL    Lymphocytes Absolute 1.4 1.0 - 5.1 K/uL    Monocytes Absolute 0.6 0.0 - 1.3 K/uL    Eosinophils Absolute 0.2 0.0 - 0.6 K/uL    Basophils Absolute 0.1 0.0 - 0.2 K/uL   Comprehensive metabolic panel   Result Value Ref Range    Sodium 140 136 - 145 mmol/L    Potassium 4.2 3.5 - 5.1 mmol/L    Chloride 104 99 - 110 mmol/L    CO2 28 21 - 32 mmol/L    Anion Gap 8 3 - 16    Glucose 107 (H) 70 - 99 mg/dL    BUN 19 7 - 20 mg/dL    CREATININE 1.0 0.8 - 1.3 mg/dL    GFR Non-African American >60 >60    GFR African American >60 >60    Calcium 9.6 8.3 - 10.6 mg/dL    Total Protein 7.2 6.4 - 8.2 g/dL    Albumin 4.6 3.4 - 5.0 g/dL    Albumin/Globulin Ratio 1.8 1.1 - 2.2    Total Bilirubin 1.2 (H) 0.0 - 1.0 mg/dL    Alkaline Phosphatase 63 40 - 129 U/L    ALT 17 10 - 40 U/L    AST 20 15 - 37 U/L    Globulin 2.6 g/dL   Lipase   Result Value Ref Range    Lipase 24.0 13.0 - 60.0 U/L   Lactic acid, plasma   Result Value Ref Range    Lactic Acid 0.7 0.4 - 2.0 mmol/L         I estimate there is LOW risk for ACUTE APPENDICITIS, BOWEL OBSTRUCTION, CHOLECYSTITIS, DIVERTICULITIS, INCARCERATED HERNIA, PANCREATITIS, or PERFORATED BOWEL or ULCER, thus I consider the discharge disposition reasonable. Also, there is no evidence or peritonitis, sepsis, or toxicity. Tana Albright III and I have discussed the diagnosis and risks, and we agree with discharging home to follow-up with their primary doctor. We also discussed returning to the Emergency Department immediately if new or worsening symptoms occur. We have discussed the symptoms which are most concerning (e.g., bloody stool, fever, changing or worsening pain, vomiting) that necessitate immediate return. FINAL Impression    1. Colitis    2.  Blood in stool Blood pressure (!) 150/96, pulse 64, temperature 98.2 °F (36.8 °C), temperature source Oral, resp. rate 20, height 6' 2.8\" (1.9 m), weight 228 lb (103.4 kg), SpO2 98 %.mdm    Patient was sent home with a prescription for below medication/s. I did [place] patient on appropriate use of these medication. Discharge Medication List as of 4/10/2021  3:52 PM      START taking these medications    Details   ciprofloxacin (CIPRO) 500 MG tablet Take 1 tablet by mouth 2 times daily for 10 days, Disp-20 tablet, R-0Normal      metroNIDAZOLE (FLAGYL) 500 MG tablet Take 1 tablet by mouth 3 times daily for 10 days, Disp-30 tablet, R-0Normal                 FOLLOW UP  [address] [place] [address]  [phone]    Call   follow up    [facility]  ED  [address]          DISPOSITION  Patient was discharged to home in good condition. Comment: Please note this report has been produced using speech recognition software and may contain errors related to that system including errors in grammar, punctuation, and spelling, as well as words and phrases that may be inappropriate. If there are any questions or concerns please feel free to contact the dictating provider for clarification.             Peterson Habermann, APRN - CNP  04/10/21 9299

## 2021-04-13 ENCOUNTER — OFFICE VISIT (OUTPATIENT)
Dept: FAMILY MEDICINE CLINIC | Age: 61
End: 2021-04-13
Payer: COMMERCIAL

## 2021-04-13 VITALS
HEART RATE: 68 BPM | HEIGHT: 74 IN | SYSTOLIC BLOOD PRESSURE: 134 MMHG | BODY MASS INDEX: 29.77 KG/M2 | WEIGHT: 232 LBS | DIASTOLIC BLOOD PRESSURE: 80 MMHG | OXYGEN SATURATION: 99 % | TEMPERATURE: 97.2 F

## 2021-04-13 DIAGNOSIS — N40.0 ENLARGED PROSTATE: ICD-10-CM

## 2021-04-13 DIAGNOSIS — K58.9 IRRITABLE BOWEL SYNDROME, UNSPECIFIED TYPE: ICD-10-CM

## 2021-04-13 DIAGNOSIS — I48.0 PAROXYSMAL ATRIAL FIBRILLATION (HCC): ICD-10-CM

## 2021-04-13 DIAGNOSIS — Z76.89 ENCOUNTER TO ESTABLISH CARE: Primary | ICD-10-CM

## 2021-04-13 DIAGNOSIS — I10 HYPERTENSION, UNSPECIFIED TYPE: ICD-10-CM

## 2021-04-13 LAB — PROSTATE SPECIFIC ANTIGEN: 0.66 NG/ML (ref 0–4)

## 2021-04-13 PROCEDURE — 99215 OFFICE O/P EST HI 40 MIN: CPT | Performed by: REGISTERED NURSE

## 2021-04-13 RX ORDER — METRONIDAZOLE 500 MG/1
500 TABLET ORAL 3 TIMES DAILY
COMMUNITY
End: 2021-07-29

## 2021-04-13 ASSESSMENT — PATIENT HEALTH QUESTIONNAIRE - PHQ9
2. FEELING DOWN, DEPRESSED OR HOPELESS: 0
1. LITTLE INTEREST OR PLEASURE IN DOING THINGS: 0
SUM OF ALL RESPONSES TO PHQ QUESTIONS 1-9: 0
SUM OF ALL RESPONSES TO PHQ QUESTIONS 1-9: 0

## 2021-04-13 ASSESSMENT — ENCOUNTER SYMPTOMS
ALLERGIC/IMMUNOLOGIC NEGATIVE: 1
NAUSEA: 0
CONSTIPATION: 0
DIARRHEA: 1
SORE THROAT: 0
SHORTNESS OF BREATH: 0
ABDOMINAL PAIN: 0
COUGH: 0
RHINORRHEA: 0

## 2021-04-13 NOTE — PROGRESS NOTES
Chief Complaint:   Jaida Westfall is a 61 y.o. male who presents for complete physical exam.      ASSESSMENT:   Well Adult, See encounter diagnoses  1. Encounter to establish care  Patient is here to establish care. He was recently in the ED for constipation and abdominal pain. The constipation and abdominal pain have resolved. Patient is still completing his course of Cipro and Flagyl. He states he has had some looser stools after starting the antibiotics but states that he no longer has abdominal pain. His main concern today is results of the CT scan which showed an enlarged prostate and a small cyst on his kidney. No follow-up was recommended for the cyst on the kidney. 2. Enlarged prostate  PSA level due to enlarged prostate identified on CT scan from 4/10/2021. Patient is not experiencing any corresponding symptoms.  - PSA, Prostatic Specific Antigen    3. Hypertension, unspecified type  Well managed with current medication. 4. Paroxysmal atrial fibrillation (HCC)  Well managed with current medication status post ablation. 5. Irritable bowel syndrome, unspecified type  Continue antibiotics and follow-up with GI as planned. Plan:   See orders and medications filed with this encounter. The patient is advised to begin progressive daily aerobic exercise program, follow a low fat, low cholesterol diet, attempt to lose weight, continue current medications, continue current healthy lifestyle patterns and return for routine annual checkups. Encouraged healthy diet, regular exercise and multivitamin daily. Labs checked per orders. Optho visit q 1-2 years. Watch for skin mole changes. Colonoscopy if over age 48 or if family history was discussed. PSA after the age of 48 or with obstructive sx's which were reviewed today. Return in about 6 months (around 10/13/2021), or if symptoms worsen or fail to improve, for Annual check up. HPI:      Here to establish.     He had stomach pain and constipation. Was seen in the ED on 4/10/21 and started on antibiotics. He is feeling better but says he has loose stools since starting these medications. He will see his GI provider in 2 weeks. He has been seeing them for IBS. He says he started drinking milk about three weeks ago. He thinks this could be why he started having issues. Enlarged prostate and cyst on kidney-these were identified on his CT scan on 4/10/2021. Patient is concerned about enlarged prostate. He denies any issues with urination. He is working on weight loss. He is a runner. He is hoping to get back to his running group. He has had his first COVID vaccination. PHYSICAL EXAMINATION:    Vitals:    04/13/21 0758   BP: 134/80   Pulse: 68   Temp: 97.2 °F (36.2 °C)   SpO2: 99%   Weight: 232 lb (105.2 kg)   Height: 6' 2\" (1.88 m)        Review of Systems   Constitutional: Negative for fatigue and fever. HENT: Negative for congestion, ear pain, rhinorrhea and sore throat. Eyes: Negative for visual disturbance. Respiratory: Negative for cough and shortness of breath. Cardiovascular: Negative for chest pain and palpitations. Gastrointestinal: Positive for diarrhea. Negative for abdominal pain, constipation and nausea. Endocrine: Negative. Genitourinary: Negative for difficulty urinating, dysuria, flank pain, frequency, hematuria and urgency. Musculoskeletal: Negative. Skin: Negative. Allergic/Immunologic: Negative. Neurological: Negative for light-headedness and headaches. Hematological: Negative. Psychiatric/Behavioral: Negative for sleep disturbance. Physical Exam  Vitals signs and nursing note reviewed. Constitutional:       General: He is not in acute distress. Appearance: Normal appearance. He is normal weight. HENT:      Head: Normocephalic and atraumatic. Right Ear: Tympanic membrane, ear canal and external ear normal. There is no impacted cerumen.       Left Ear: Tympanic membrane, ear canal and external ear normal. There is no impacted cerumen. Nose: Nose normal. No congestion or rhinorrhea. Mouth/Throat:      Mouth: Mucous membranes are moist.      Pharynx: Oropharynx is clear. No oropharyngeal exudate or posterior oropharyngeal erythema. Eyes:      General:         Right eye: No discharge. Left eye: No discharge. Extraocular Movements: Extraocular movements intact. Conjunctiva/sclera: Conjunctivae normal.      Pupils: Pupils are equal, round, and reactive to light. Neck:      Musculoskeletal: Normal range of motion and neck supple. No muscular tenderness. Cardiovascular:      Rate and Rhythm: Normal rate and regular rhythm. Pulses: Normal pulses. Heart sounds: Normal heart sounds. No murmur. No friction rub. No gallop. Pulmonary:      Effort: Pulmonary effort is normal.      Breath sounds: Normal breath sounds. No stridor. No wheezing, rhonchi or rales. Abdominal:      General: Abdomen is flat. Bowel sounds are normal. There is no distension. Palpations: Abdomen is soft. There is no mass. Tenderness: There is no abdominal tenderness. There is no right CVA tenderness, left CVA tenderness, guarding or rebound. Hernia: No hernia is present. Musculoskeletal: Normal range of motion. General: No swelling or tenderness. Right lower leg: No edema. Left lower leg: No edema. Lymphadenopathy:      Cervical: No cervical adenopathy. Skin:     General: Skin is warm and dry. Capillary Refill: Capillary refill takes less than 2 seconds. Coloration: Skin is not jaundiced or pale. Findings: No erythema. Neurological:      General: No focal deficit present. Mental Status: He is alert and oriented to person, place, and time. Mental status is at baseline. Psychiatric:         Mood and Affect: Mood normal.         Behavior: Behavior normal.         Thought Content:  Thought content normal.         Judgment: Judgment normal.            Patient Active Problem List   Diagnosis    HTN (hypertension)    Paroxysmal atrial fibrillation (HCC)    S/P ablation of atrial fibrillation    Ventral hernia    GERD (gastroesophageal reflux disease)    Left atrial dilation    Mitral regurgitation    ART on CPAP     Past Medical History:   Diagnosis Date    [facility]     Atrial fibrillation ([facility]     CAD (coronary artery disease)     ablation, at fib    Chicken pox     Hypertension     Nonischemic cardiomyopathy ([facility] 3/29/2011    ART (obstructive sleep apnea)     Sleep apnea     wears a cpap    Wide-complex tachycardia ([facility] 3/29/2011       Past Surgical History:   Procedure Laterality Date    ABLATION OF DYSRHYTHMIC FOCUS      CARDIAC SURGERY      stent    COLONOSCOPY  4/13/2015    Diverticulosis    CYST INCISION AND DRAINAGE      MRSA infection    UPPER GASTROINTESTINAL ENDOSCOPY  8/22/2012     Most Recent Immunizations   Administered Date(s) Administered    COVID-19, Moderna, PF, 100mcg/0.5mL 03/18/2021    Influenza, [place], Recombinant, IM PF (Flublok 18 yrs and older) 09/30/2020    Tdap (Boostrix, Adacel) 07/23/2013        Current Outpatient Medications   Medication Sig Dispense Refill    metroNIDAZOLE (FLAGYL) 500 MG tablet Take 500 mg by mouth 3 times daily      ciprofloxacin (CIPRO) 500 MG tablet Take 1 tablet by mouth 2 times daily for 10 days 20 tablet 0    amLODIPine (NORVASC) 10 MG tablet Take 10 mg by mouth daily      hydroCHLOROthiazide (HYDRODIURIL) 25 MG tablet       apixaban (ELIQUIS) 5 MG TABS tablet TAKE ONE TABLET BY MOUTH TWICE A DAY      carvedilol (COREG) 12.5 MG tablet Take 12.5 mg by mouth 2 times daily (with meals).  minocycline (MINOCIN;DYNACIN) 100 MG capsule Take 100 mg by mouth daily.  lisinopril (PRINIVIL;ZESTRIL) 40 MG tablet Take 40 mg by mouth daily.        No current facility-administered medications for this

## 2021-04-14 ENCOUNTER — PATIENT MESSAGE (OUTPATIENT)
Dept: FAMILY MEDICINE CLINIC | Age: 61
End: 2021-04-14

## 2021-04-14 NOTE — TELEPHONE ENCOUNTER
From: Beth Ayala III  To: Jean Izaguirre, OFE - CNP  Sent: 4/14/2021 11:30 AM EDT  Subject: Non-Urgent Medical Question    Jessica,    What are some good foods to eat during a flareup of Colitis? Fruits, [place] Breast, Chicken Breast etc. How long before the medications I received start to work, just curious, thanks.  Buck Rich

## 2021-04-19 ENCOUNTER — HOSPITAL ENCOUNTER (EMERGENCY)
Age: 61
Discharge: HOME | End: 2021-04-19
Attending: EMERGENCY MEDICINE
Payer: COMMERCIAL

## 2021-04-19 VITALS
HEART RATE: 69 BPM | WEIGHT: 225 LBS | HEIGHT: 75 IN | TEMPERATURE: 98 F | OXYGEN SATURATION: 97 % | BODY MASS INDEX: 27.98 KG/M2 | SYSTOLIC BLOOD PRESSURE: 138 MMHG | DIASTOLIC BLOOD PRESSURE: 84 MMHG | RESPIRATION RATE: 17 BRPM

## 2021-04-19 DIAGNOSIS — K59.00 CONSTIPATION, UNSPECIFIED CONSTIPATION TYPE: ICD-10-CM

## 2021-04-19 DIAGNOSIS — R10.9 ABDOMINAL PAIN, UNSPECIFIED ABDOMINAL LOCATION: Primary | ICD-10-CM

## 2021-04-19 LAB
A/G RATIO: 1.8 (ref 1.1–2.2)
ALBUMIN SERPL-MCNC: 4.5 G/DL (ref 3.4–5)
ALP BLD-CCNC: 56 U/L (ref 40–129)
ALT SERPL-CCNC: 16 U/L (ref 10–40)
ANION GAP SERPL CALCULATED.3IONS-SCNC: 10 MMOL/L (ref 3–16)
AST SERPL-CCNC: 23 U/L (ref 15–37)
BASOPHILS ABSOLUTE: 0.1 K/UL (ref 0–0.2)
BASOPHILS RELATIVE PERCENT: 1.1 %
BILIRUB SERPL-MCNC: 0.9 MG/DL (ref 0–1)
BILIRUBIN URINE: NEGATIVE
BLOOD, URINE: NEGATIVE
BUN BLDV-MCNC: 16 MG/DL (ref 7–20)
CALCIUM SERPL-MCNC: 9.3 MG/DL (ref 8.3–10.6)
CHLORIDE BLD-SCNC: 99 MMOL/L (ref 99–110)
CLARITY: CLEAR
CO2: 28 MMOL/L (ref 21–32)
COLOR: YELLOW
CREAT SERPL-MCNC: 1 MG/DL (ref 0.8–1.3)
EOSINOPHILS ABSOLUTE: 0.4 K/UL (ref 0–0.6)
EOSINOPHILS RELATIVE PERCENT: 6.6 %
GFR AFRICAN AMERICAN: >60
GFR NON-AFRICAN AMERICAN: >60
GLOBULIN: 2.5 G/DL
GLUCOSE BLD-MCNC: 103 MG/DL (ref 70–99)
GLUCOSE URINE: NEGATIVE MG/DL
HCT VFR BLD CALC: 41.7 % (ref 40.5–52.5)
HEMOGLOBIN: 14.6 G/DL (ref 13.5–17.5)
KETONES, URINE: 15 MG/DL
LEUKOCYTE ESTERASE, URINE: ABNORMAL
LYMPHOCYTES ABSOLUTE: 1.8 K/UL (ref 1–5.1)
LYMPHOCYTES RELATIVE PERCENT: 26.7 %
MAGNESIUM: 2.1 MG/DL (ref 1.8–2.4)
MCH RBC QN AUTO: 29.2 PG (ref 26–34)
MCHC RBC AUTO-ENTMCNC: 35.1 G/DL (ref 31–36)
MCV RBC AUTO: 83.2 FL (ref 80–100)
MICROSCOPIC EXAMINATION: YES
MONOCYTES ABSOLUTE: 0.9 K/UL (ref 0–1.3)
MONOCYTES RELATIVE PERCENT: 13.1 %
NEUTROPHILS ABSOLUTE: 3.5 K/UL (ref 1.7–7.7)
NEUTROPHILS RELATIVE PERCENT: 52.5 %
NITRITE, URINE: NEGATIVE
PDW BLD-RTO: 13 % (ref 12.4–15.4)
PH UA: 5.5 (ref 5–8)
PLATELET # BLD: 158 K/UL (ref 135–450)
PMV BLD AUTO: 7.4 FL (ref 5–10.5)
POTASSIUM REFLEX MAGNESIUM: 3.5 MMOL/L (ref 3.5–5.1)
PROTEIN UA: NEGATIVE MG/DL
RBC # BLD: 5.01 M/UL (ref 4.2–5.9)
RBC UA: NORMAL /HPF (ref 0–4)
SODIUM BLD-SCNC: 137 MMOL/L (ref 136–145)
SPECIFIC GRAVITY UA: 1.02 (ref 1–1.03)
SPECIMEN STATUS: NORMAL
TOTAL PROTEIN: 7 G/DL (ref 6.4–8.2)
URINE REFLEX TO CULTURE: ABNORMAL
URINE TYPE: ABNORMAL
UROBILINOGEN, URINE: 0.2 E.U./DL
WBC # BLD: 6.7 K/UL (ref 4–11)
WBC UA: NORMAL /HPF (ref 0–5)

## 2021-04-19 PROCEDURE — 99285 EMERGENCY DEPT VISIT HI MDM: CPT

## 2021-04-19 PROCEDURE — 81001 URINALYSIS AUTO W/SCOPE: CPT

## 2021-04-19 PROCEDURE — 80053 COMPREHEN METABOLIC PANEL: CPT

## 2021-04-19 PROCEDURE — 83735 ASSAY OF MAGNESIUM: CPT

## 2021-04-19 PROCEDURE — 85025 COMPLETE CBC W/AUTO DIFF WBC: CPT

## 2021-04-19 RX ORDER — POLYETHYLENE GLYCOL 3350 17 G/17G
17 POWDER, FOR SOLUTION ORAL DAILY
Qty: 510 G | Refills: 0 | Status: SHIPPED | OUTPATIENT
Start: 2021-04-19 | End: 2021-05-19

## 2021-04-19 ASSESSMENT — PAIN SCALES - GENERAL: PAINLEVEL_OUTOF10: 5

## 2021-04-19 NOTE — ED PROVIDER NOTES
lisinopril (PRINIVIL;ZESTRIL) 40 MG tablet Take 40 mg by mouth daily.     Historical Provider, MD       Allergies as of 04/19/2021 - Review Complete 04/19/2021   Allergen Reaction Noted    Amoxicillin  11/07/2010    Amoxicillin  08/22/2012    Penicillins  01/28/2010       Past Medical History:   Diagnosis Date    [address]rial fibrillation ([address]     Atrial fibrillation ([address]     CAD (coronary artery disease)     ablation, at fib    Chicken pox     Hypertension     Nonischemic cardiomyopathy ([address] 3/29/2011    ART (obstructive sleep apnea)     Sleep apnea     wears a cpap    Wide-complex tachycardia ([address] 3/29/2011        Surgical History:   Past Surgical History:   Procedure Laterality Date    ABLATION OF DYSRHYTHMIC FOCUS      CARDIAC SURGERY      stent    COLONOSCOPY  4/13/2015    Diverticulosis    CYST INCISION AND DRAINAGE      MRSA infection    UPPER GASTROINTESTINAL ENDOSCOPY  8/22/2012        Family History:    Family History   Problem Relation Age of Onset    Cancer Mother         breast       Social History     Socioeconomic History    Marital status: Single     Spouse name: Not on file    Number of children: Not on file    Years of education: Not on file    Highest education level: Not on file   Occupational History    Not on file   Social Needs    Financial resource strain: Not on file    Food insecurity     Worry: Not on file     Inability: Not on file    Transportation needs     Medical: Not on file     Non-medical: Not on file   Tobacco Use    Smoking status: Never Smoker    Smokeless tobacco: Never Used   Substance and Sexual Activity    Alcohol use: No     Comment: 1/mthh    Drug use: No    Sexual activity: Never   Lifestyle    Physical activity     Days per week: Not on file     Minutes per session: Not on file    Stress: Not on file   Relationships    Social connections     Talks on phone: Not on file     Gets together: Not on file     Attends [employer] service: Not on file     Active member of club or organization: Not on file     Attends meetings of clubs or organizations: Not on file     Relationship status: Not on file    Intimate partner violence     Fear of current or ex partner: Not on file     Emotionally abused: Not on file     Physically abused: Not on file     Forced sexual activity: Not on file   Other Topics Concern    Not on file   Social History Narrative    ** Merged History Encounter **            Nursing notes reviewed. ED Triage Vitals [04/19/21 0458]   Enc Vitals Group      BP (!) 149/95      Pulse 68      Resp 18      Temp 98 °F (36.7 °C)      Temp Source Oral      SpO2 100 %      Weight 225 lb (102.1 kg)      Height 6' 3\" (1.905 m)      Head Circumference       Peak Flow       Pain Score       Pain Loc       Pain Edu? Excl. in [address]? GENERAL:  Awake, alert. Well developed, well nourished with no apparent distress. HENT:  Normocephalic, Atraumatic, moist mucous membranes. EYES:  Pupils equal round and reactive to light, Conjunctiva normal, extraocular movements normal.  NECK:  No meningeal signs, Supple. CHEST:  Regular rate and rhythm, chest wall non-tender. LUNGS:  Clear to auscultation bilaterally. ABDOMEN:  Soft, non-tender, no rebound, rigidity or guarding, non-distended, normal bowel sounds. No costovertebral angle tenderness to palpation. BACK:  No tenderness. EXTREMITIES:  Normal range of motion, no edema, no bony tenderness, no deformity, distal pulses present. SKIN: Warm, dry and intact. NEUROLOGIC: Normal mental status. Moving all extremities to command.      LABS  Labs Reviewed   COMPREHENSIVE METABOLIC PANEL W/ REFLEX TO MG FOR LOW K - Abnormal; Notable for the following components:       Result Value    Glucose 103 (*)     All other components within normal limits    Narrative:     Performed at:  [facility] EMERGENCY [facility]  [address],  [place], [address] [place] [address]   Phone [phone]   URINE RT REFLEX TO CULTURE - Abnormal; Notable for the following components:    Ketones, Urine 15 (*)     Leukocyte Esterase, Urine TRACE (*)     All other components within normal limits    Narrative:     Performed at:  [address], [facility] [employer] [facility]   Phone [phone]   CBC WITH AUTO DIFFERENTIAL    Narrative:     Performed at:  [place] [address] [facility]   Phone [phone]   SAMPLE POSSIBLE BLOOD BANK TESTING    Narrative:     Performed at:  [place] [address] [facility] [employer]   Phone [phone]   MAGNESIUM    Narrative:     Performed at:  [place] [address] [facility] [employer]   Phone [phone]   MICROSCOPIC URINALYSIS    Narrative:     Performed at:  [place] [address] [facility] [facility] [employer]   Phone [phone]       MEDICAL DECISION MAKING        I advised the patient to return to the emergency department immediately for any new or worsening symptoms, such as fever, abdominal pain or vomiting. The patient voiced agreement and understanding of the treatment plan. I estimate there is LOW risk for ACUTE APPENDICITIS, BOWEL OBSTRUCTION, CHOLECYSTITIS, DIVERTICULITIS, INCARCERATED HERNIA, PANCREATITIS, or PERFORATED BOWEL or ULCER, thus I consider the discharge disposition reasonable. Also, there is no evidence or peritonitis, sepsis, or toxicity. Jl Price III and I have discussed the diagnosis and risks, and we agree with discharging home to follow-up with their primary doctor. We also discussed returning to the Emergency Department immediately if new or worsening symptoms occur. We have discussed the symptoms which are most concerning (e.g., bloody stool, fever, changing or worsening pain, vomiting) that necessitate immediate return.      FINAL

## 2021-07-29 ENCOUNTER — OFFICE VISIT (OUTPATIENT)
Dept: FAMILY MEDICINE CLINIC | Age: 61
End: 2021-07-29
Payer: COMMERCIAL

## 2021-07-29 VITALS
RESPIRATION RATE: 18 BRPM | HEART RATE: 76 BPM | SYSTOLIC BLOOD PRESSURE: 102 MMHG | DIASTOLIC BLOOD PRESSURE: 80 MMHG | OXYGEN SATURATION: 98 % | WEIGHT: 227 LBS | BODY MASS INDEX: 28.37 KG/M2

## 2021-07-29 DIAGNOSIS — R53.83 FATIGUE, UNSPECIFIED TYPE: Primary | ICD-10-CM

## 2021-07-29 LAB
A/G RATIO: 2.4 (ref 1.1–2.2)
ALBUMIN SERPL-MCNC: 4.8 G/DL (ref 3.4–5)
ALP BLD-CCNC: 56 U/L (ref 40–129)
ALT SERPL-CCNC: 15 U/L (ref 10–40)
ANION GAP SERPL CALCULATED.3IONS-SCNC: 11 MMOL/L (ref 3–16)
AST SERPL-CCNC: 23 U/L (ref 15–37)
BASOPHILS ABSOLUTE: 0.1 K/UL (ref 0–0.2)
BASOPHILS RELATIVE PERCENT: 0.9 %
BILIRUB SERPL-MCNC: 1.1 MG/DL (ref 0–1)
BUN BLDV-MCNC: 21 MG/DL (ref 7–20)
CALCIUM SERPL-MCNC: 9.4 MG/DL (ref 8.3–10.6)
CHLORIDE BLD-SCNC: 101 MMOL/L (ref 99–110)
CO2: 29 MMOL/L (ref 21–32)
CREAT SERPL-MCNC: 1 MG/DL (ref 0.8–1.3)
EOSINOPHILS ABSOLUTE: 0.2 K/UL (ref 0–0.6)
EOSINOPHILS RELATIVE PERCENT: 2.4 %
FOLATE: 14.69 NG/ML (ref 4.78–24.2)
GFR AFRICAN AMERICAN: >60
GFR NON-AFRICAN AMERICAN: >60
GLOBULIN: 2 G/DL
GLUCOSE BLD-MCNC: 98 MG/DL (ref 70–99)
HCT VFR BLD CALC: 39 % (ref 40.5–52.5)
HEMOGLOBIN: 13.8 G/DL (ref 13.5–17.5)
LYMPHOCYTES ABSOLUTE: 1.5 K/UL (ref 1–5.1)
LYMPHOCYTES RELATIVE PERCENT: 19.4 %
MCH RBC QN AUTO: 29.2 PG (ref 26–34)
MCHC RBC AUTO-ENTMCNC: 35.4 G/DL (ref 31–36)
MCV RBC AUTO: 82.6 FL (ref 80–100)
MONOCYTES ABSOLUTE: 0.7 K/UL (ref 0–1.3)
MONOCYTES RELATIVE PERCENT: 9.2 %
NEUTROPHILS ABSOLUTE: 5.3 K/UL (ref 1.7–7.7)
NEUTROPHILS RELATIVE PERCENT: 68.1 %
PDW BLD-RTO: 12.9 % (ref 12.4–15.4)
PLATELET # BLD: 172 K/UL (ref 135–450)
PMV BLD AUTO: 8.7 FL (ref 5–10.5)
POTASSIUM SERPL-SCNC: 3.9 MMOL/L (ref 3.5–5.1)
RBC # BLD: 4.72 M/UL (ref 4.2–5.9)
SODIUM BLD-SCNC: 141 MMOL/L (ref 136–145)
TOTAL PROTEIN: 6.8 G/DL (ref 6.4–8.2)
TSH REFLEX FT4: 1.36 UIU/ML (ref 0.27–4.2)
VITAMIN B-12: 528 PG/ML (ref 211–911)
WBC # BLD: 7.8 K/UL (ref 4–11)

## 2021-07-29 PROCEDURE — 99213 OFFICE O/P EST LOW 20 MIN: CPT | Performed by: REGISTERED NURSE

## 2021-07-29 SDOH — ECONOMIC STABILITY: FOOD INSECURITY: WITHIN THE PAST 12 MONTHS, THE FOOD YOU BOUGHT JUST DIDN'T LAST AND YOU DIDN'T HAVE MONEY TO GET MORE.: NEVER TRUE

## 2021-07-29 SDOH — ECONOMIC STABILITY: FOOD INSECURITY: WITHIN THE PAST 12 MONTHS, YOU WORRIED THAT YOUR FOOD WOULD RUN OUT BEFORE YOU GOT MONEY TO BUY MORE.: NEVER TRUE

## 2021-07-29 ASSESSMENT — ENCOUNTER SYMPTOMS
BACK PAIN: 0
COLOR CHANGE: 0
GASTROINTESTINAL NEGATIVE: 1
SINUS PRESSURE: 1
COUGH: 0
SORE THROAT: 0
SHORTNESS OF BREATH: 0

## 2021-07-29 ASSESSMENT — SOCIAL DETERMINANTS OF HEALTH (SDOH): HOW HARD IS IT FOR YOU TO PAY FOR THE VERY BASICS LIKE FOOD, HOUSING, MEDICAL CARE, AND HEATING?: NOT HARD AT ALL

## 2021-07-29 NOTE — PROGRESS NOTES
Patient: Maia Montes is a 64 y.o. male who presents today with the following Chief Complaint(s):  Chief Complaint   Patient presents with    Follow-up     numbness in area where he had shingles 2 years ago    Fatigue       Assessment/Plan:  1. Fatigue, unspecified type  Clinical exam is grossly normal.  Based on history of present illness patient symptoms are possibly due to caffeine withdrawal.  He stopped caffeine approximately 2 weeks ago right before the symptoms started. I also discussed with him that this could be a virus. He requests to have some blood work drawn. He is concerned about his B12 level he is feeling so tired. He is feeling better today. Recommended he continue with rest and drink plenty of fluids. Continue caffeine avoidance. Return for any questions, concerns or worsening symptoms.  - CBC Auto Differential  - Comprehensive Metabolic Panel  - TSH WITH REFLEX TO FT4  - Vitamin B12 & Folate      Return if symptoms worsen or fail to improve. HPI:     He was in [place] about 1 week ago and on Monday he began feeling tired. He is now having fatigue and some brain fog, no appetite and difficulty concentrating. He was still feeling badly yesterday but ate something and then started feeling better. Today he is feeling even better. He is eating better now. He says he did feel some sinus congestion while he was in [place]. He does have allergies. He recently started a diet and he has recently stopped drinking soda. He has not had caffeine in almost two weeks. He says that he had shingles on the left side of his neck/head a couple of years ago. He says that recently he noticed when he got his haircut that area where he had the shingles felt different than the other side of his head. He does not have any pain or rash.       Current Outpatient Medications   Medication Sig Dispense Refill    amLODIPine (NORVASC) 10 MG tablet Take 10 mg by mouth daily      hydroCHLOROthiazide (HYDRODIURIL) 25 MG tablet       apixaban (ELIQUIS) 5 MG TABS tablet TAKE ONE TABLET BY MOUTH TWICE A DAY      carvedilol (COREG) 12.5 MG tablet Take 12.5 mg by mouth 2 times daily (with meals).  minocycline (MINOCIN;DYNACIN) 100 MG capsule Take 100 mg by mouth daily.  lisinopril (PRINIVIL;ZESTRIL) 40 MG tablet Take 40 mg by mouth daily. No current facility-administered medications for this visit. Review of Systems   Constitutional: Positive for fatigue. Negative for chills and diaphoresis. HENT: Positive for sinus pressure. Negative for congestion and sore throat. Respiratory: Negative for cough and shortness of breath. Cardiovascular: Negative for chest pain, palpitations and leg swelling. Gastrointestinal: Negative. Genitourinary: Negative. Musculoskeletal: Negative for arthralgias, back pain, myalgias and neck pain. Skin: Negative for color change, pallor and rash. Neurological: Negative for dizziness, light-headedness and headaches. Vitals:    07/29/21 1350   BP: 102/80   Pulse: 76   Resp: 18   SpO2: 98%   Weight: 227 lb (103 kg)     Physical Exam  Constitutional:       General: He is not in acute distress. Appearance: Normal appearance. He is not ill-appearing. HENT:      Head: Normocephalic and atraumatic. Nose: Nose normal.      Mouth/Throat:      Mouth: Mucous membranes are moist.   Eyes:      Extraocular Movements: Extraocular movements intact. Pupils: Pupils are equal, round, and reactive to light. Cardiovascular:      Rate and Rhythm: Normal rate and regular rhythm. Pulses: Normal pulses. Heart sounds: Normal heart sounds. No murmur heard. No friction rub. No gallop. Pulmonary:      Effort: Pulmonary effort is normal.      Breath sounds: Normal breath sounds. No stridor. No wheezing, rhonchi or rales. Musculoskeletal:         General: Normal range of motion.       Cervical back: Normal range of motion and neck supple. No tenderness. Right lower leg: No edema. Left lower leg: No edema. Lymphadenopathy:      Cervical: No cervical adenopathy. Skin:     General: Skin is warm and dry. Coloration: Skin is not jaundiced or pale. Findings: No erythema or rash. Neurological:      Mental Status: He is alert and oriented to person, place, and time. Cranial Nerves: No cranial nerve deficit. Sensory: No sensory deficit. Motor: No weakness. Coordination: Coordination normal.      Gait: Gait normal.   Psychiatric:         Mood and Affect: Mood normal.         Behavior: Behavior normal.         Thought Content: Thought content normal.         Judgment: Judgment normal.            Patient's past medical history, surgical history, family history, medications,  and allergies  were all reviewed and updated as appropriate today.     Patient Active Problem List   Diagnosis    HTN (hypertension)    Paroxysmal atrial fibrillation (HCC)    S/P ablation of atrial fibrillation    Ventral hernia    GERD (gastroesophageal reflux disease)    Left atrial dilation    Mitral regurgitation    ART on CPAP     Past Medical History:   Diagnosis Date    Atrial fibrillation ([address]     Atrial fibrillation ([address]     CAD (coronary artery disease)     ablation, at fib    Chicken pox     Hypertension     Nonischemic cardiomyopathy ([address] 3/29/2011    ART (obstructive sleep apnea)     Sleep apnea     wears a cpap    Wide-complex tachycardia ([address] 3/29/2011      Past Surgical History:   Procedure Laterality Date    ABLATION OF DYSRHYTHMIC FOCUS      CARDIAC SURGERY      stent    COLONOSCOPY  4/13/2015    Diverticulosis    CYST INCISION AND DRAINAGE      MRSA infection    UPPER GASTROINTESTINAL ENDOSCOPY  8/22/2012       Family History   Problem Relation Age of Onset    Cancer Mother         breast      Allergies   Allergen Reactions    Amoxicillin     Amoxicillin     Penicillins        This chart was generated using the [address] [place] [address] dictation system. I created this record but it may contain dictation errors due to the limitation of the software.

## 2021-08-24 ENCOUNTER — OFFICE VISIT (OUTPATIENT)
Dept: FAMILY MEDICINE CLINIC | Age: 61
End: 2021-08-24
Payer: COMMERCIAL

## 2021-08-24 VITALS
WEIGHT: 226 LBS | SYSTOLIC BLOOD PRESSURE: 128 MMHG | TEMPERATURE: 97.7 F | BODY MASS INDEX: 28.1 KG/M2 | OXYGEN SATURATION: 98 % | HEART RATE: 79 BPM | DIASTOLIC BLOOD PRESSURE: 78 MMHG | HEIGHT: 75 IN

## 2021-08-24 DIAGNOSIS — R52 SHOOTING PAIN: Primary | ICD-10-CM

## 2021-08-24 LAB
A/G RATIO: 2.2 (ref 1.1–2.2)
ALBUMIN SERPL-MCNC: 4.8 G/DL (ref 3.4–5)
ALP BLD-CCNC: 72 U/L (ref 40–129)
ALT SERPL-CCNC: 18 U/L (ref 10–40)
ANION GAP SERPL CALCULATED.3IONS-SCNC: 12 MMOL/L (ref 3–16)
AST SERPL-CCNC: 22 U/L (ref 15–37)
BILIRUB SERPL-MCNC: 1.3 MG/DL (ref 0–1)
BUN BLDV-MCNC: 21 MG/DL (ref 7–20)
CALCIUM SERPL-MCNC: 9.7 MG/DL (ref 8.3–10.6)
CHLORIDE BLD-SCNC: 103 MMOL/L (ref 99–110)
CO2: 26 MMOL/L (ref 21–32)
CREAT SERPL-MCNC: 1 MG/DL (ref 0.8–1.3)
GFR AFRICAN AMERICAN: >60
GFR NON-AFRICAN AMERICAN: >60
GLOBULIN: 2.2 G/DL
GLUCOSE BLD-MCNC: 123 MG/DL (ref 70–99)
MAGNESIUM: 2 MG/DL (ref 1.8–2.4)
POTASSIUM SERPL-SCNC: 3.7 MMOL/L (ref 3.5–5.1)
SODIUM BLD-SCNC: 141 MMOL/L (ref 136–145)
TOTAL PROTEIN: 7 G/DL (ref 6.4–8.2)

## 2021-08-24 PROCEDURE — 99213 OFFICE O/P EST LOW 20 MIN: CPT | Performed by: REGISTERED NURSE

## 2021-08-24 ASSESSMENT — ENCOUNTER SYMPTOMS
SHORTNESS OF BREATH: 0
GASTROINTESTINAL NEGATIVE: 1
BACK PAIN: 0
COLOR CHANGE: 0
COUGH: 0

## 2021-08-24 NOTE — PROGRESS NOTES
Patient: Marylen Gun is a 64 y.o. male who presents today with the following Chief Complaint(s):  Chief Complaint   Patient presents with    Numbness     feet       Assessment/Plan:  1. Shooting pain   The pain patient is having is likely musculoskeletal but cannot rule out neuropathy. He has been seeing PT due to some piriformis issues and tight hamstrings. He has also been evaluated for bilateral knee pain and says he was diagnosed with bursitis. No decreased sensation or weakness on clinical exam.  He is not having any difficulty with regular walking/exercise. There is no evidence of perfusion issuesbilateral legs are pink and warm with capillary refill less than 2 seconds. No pain in bilateral calves. Mookie sign negative. B12 level completed on 2021 was normal.  Referral to neurology for further evaluation.  - Comprehensive Metabolic Panel  - [facility] - Stephanie Chan DO, Neurology, [facility]  - [facility]      Return if symptoms worsen or fail to improve. HPI:     He has been having sharp shooting pain in bilateral feet and lower legs for the past week or so. The pain is intermittent. He has not tried anything to help with pain. Rest helps. He walks daily and this seems to help as well. No swelling or redness in bilateral legs. No chest pain or shortness of breath. He has concerns about ALShe had a friend who  from [facility] and is worried that he could have this as well. Current Outpatient Medications   Medication Sig Dispense Refill    amLODIPine (NORVASC) 10 MG tablet Take 10 mg by mouth daily      hydroCHLOROthiazide (HYDRODIURIL) 25 MG tablet       apixaban (ELIQUIS) 5 MG TABS tablet TAKE ONE TABLET BY MOUTH TWICE A DAY      carvedilol (COREG) 12.5 MG tablet Take 12.5 mg by mouth 2 times daily (with meals).  minocycline (MINOCIN;DYNACIN) 100 MG capsule Take 100 mg by mouth daily.  lisinopril (PRINIVIL;ZESTRIL) 40 MG tablet Take 40 mg by mouth daily.        No current facility-administered medications for this visit. Review of Systems   Constitutional: Negative for fatigue and fever. Respiratory: Negative for cough and shortness of breath. Cardiovascular: Negative for chest pain, palpitations and leg swelling. Gastrointestinal: Negative. Genitourinary: Negative. Musculoskeletal: Negative for arthralgias, back pain, joint swelling, myalgias and neck pain. Skin: Negative for color change, pallor and rash. Neurological: Negative for dizziness, weakness, light-headedness and headaches. Intermittent shooting pain in bilateral lower legs/feet- more on right   Hematological: Bruises/bleeds easily ( On Eliquis). Vitals:    08/24/21 0846   BP: 128/78   Pulse: 79   Temp: 97.7 °F (36.5 °C)   SpO2: 98%   Weight: 226 lb (102.5 kg)   Height: 6' 3\" (1.905 m)     Physical Exam  Constitutional:       General: He is not in acute distress. Appearance: Normal appearance. He is not ill-appearing, toxic-appearing or diaphoretic. HENT:      Head: Normocephalic and atraumatic. Cardiovascular:      Rate and Rhythm: Normal rate and regular rhythm. Pulses: Normal pulses. Heart sounds: Normal heart sounds. No murmur heard. No friction rub. No gallop. Pulmonary:      Effort: Pulmonary effort is normal. No respiratory distress. Breath sounds: Normal breath sounds. No wheezing, rhonchi or rales. Musculoskeletal:         General: No swelling or tenderness. Cervical back: Normal range of motion and neck supple. Right lower leg: No edema. Left lower leg: No edema. Skin:     General: Skin is warm and dry. Capillary Refill: Capillary refill takes less than 2 seconds. Coloration: Skin is not jaundiced or pale. Findings: No bruising or erythema. Neurological:      General: No focal deficit present. Mental Status: He is alert and oriented to person, place, and time. Sensory: No sensory deficit.       Motor: No weakness. Gait: Gait normal.   Psychiatric:         Mood and Affect: Mood normal.         Behavior: Behavior normal.         Thought Content: Thought content normal.         Judgment: Judgment normal.            Patient's past medical history, surgical history, family history, medications,  and allergies  were all reviewed and updated as appropriate today. Patient Active Problem List   Diagnosis    HTN (hypertension)    Paroxysmal atrial fibrillation (HCC)    S/P ablation of atrial fibrillation    Ventral hernia    GERD (gastroesophageal reflux disease)    Left atrial dilation    Mitral regurgitation    ART on CPAP     Past Medical History:   Diagnosis Date    Atrial fibrillation ([address]     Atrial fibrillation ([address]     CAD (coronary artery disease)     ablation, at fib    Chicken pox     Hypertension     Nonischemic cardiomyopathy ([address] 3/29/2011    ART (obstructive sleep apnea)     Sleep apnea     wears a cpap    Wide-complex tachycardia ([address] 3/29/2011      Past Surgical History:   Procedure Laterality Date    ABLATION OF DYSRHYTHMIC FOCUS      CARDIAC SURGERY      stent    COLONOSCOPY  4/13/2015    Diverticulosis    CYST INCISION AND DRAINAGE      MRSA infection    UPPER GASTROINTESTINAL ENDOSCOPY  8/22/2012       Family History   Problem Relation Age of Onset    Cancer Mother         breast      Allergies   Allergen Reactions    Amoxicillin     Amoxicillin     Penicillins        This chart was generated using the [address] [place] [address] dictation system. I created this record but it may contain dictation errors due to the limitation of the software.

## 2021-09-08 ENCOUNTER — OFFICE VISIT (OUTPATIENT)
Dept: FAMILY MEDICINE CLINIC | Age: 61
End: 2021-09-08
Payer: COMMERCIAL

## 2021-09-08 VITALS
TEMPERATURE: 98.2 F | SYSTOLIC BLOOD PRESSURE: 128 MMHG | HEIGHT: 75 IN | HEART RATE: 70 BPM | BODY MASS INDEX: 28.35 KG/M2 | DIASTOLIC BLOOD PRESSURE: 80 MMHG | WEIGHT: 228 LBS | OXYGEN SATURATION: 98 %

## 2021-09-08 DIAGNOSIS — M54.31 BILATERAL SCIATICA: Primary | ICD-10-CM

## 2021-09-08 DIAGNOSIS — M54.32 BILATERAL SCIATICA: Primary | ICD-10-CM

## 2021-09-08 PROCEDURE — 99213 OFFICE O/P EST LOW 20 MIN: CPT | Performed by: REGISTERED NURSE

## 2021-09-08 RX ORDER — METHYLPREDNISOLONE 4 MG/1
TABLET ORAL
Qty: 21 TABLET | Refills: 0 | Status: ON HOLD
Start: 2021-09-08 | End: 2021-09-15 | Stop reason: HOSPADM

## 2021-09-08 ASSESSMENT — ENCOUNTER SYMPTOMS
BACK PAIN: 1
GASTROINTESTINAL NEGATIVE: 1
SHORTNESS OF BREATH: 0

## 2021-09-08 NOTE — PROGRESS NOTES
Patient: Fabien Foley is a 64 y.o. male who presents today with the following Chief Complaint(s):  Chief Complaint   Patient presents with    Other     nerve pain       Assessment/Plan:  1. Bilateral sciatica  Over the past couple of weeks since last visit patient has been working with his physical therapist.  He feels that his bilateral shooting pain and tightness in his legs is related to some intermittent back pain that he has been experiencing. He states that he has had a herniated disc previously and says this does not feel the same. He has an appointment with a neurologist in 2 weeks. He says he may try to make an appointment with [address] [place] [address] for further evaluation. He says he has been told that he has some degeneration in his spine previously. Has not had any injury or trauma. Discussed trialing a steroid to see if this helps with the intermittent back pain and the sciatica he is experiencing. No focal deficits or CN deficits identified. Patient will follow up with neurology regarding the brain fog he has mentioned. Discussed with him that this could also be due to not sleeping well and his concerns about the pain he has been having in his legs. He agrees to plan and will monitor for signs and symptoms and call for any questions or concerns. Worsening  - methylPREDNISolone (MEDROL, MARJAN,) 4 MG tablet; Take by mouth. Take 6 tabs on day 1, 5 tabs on day 2, 4 tabs on day 3, 3 tabs on day 4, 2 tabs on day 5, 1 tab on day 6  Dispense: 21 tablet; Refill: 0      Return if symptoms worsen or fail to improve. HPI:     He says he has continued to have intermittent shooting pain in both legs. He says that he notices that he has some brain fog and back pain which he says accompanies the tingling in his loegs. He feels that this has gotten better over the past couple of weeks. He has been seeing PT for another issue and he said they think that he has some spinal stenosis.    He denies headaches, normal. No respiratory distress. Musculoskeletal:         General: No swelling, tenderness or deformity. Cervical back: Normal range of motion and neck supple. No tenderness. Right lower leg: No edema. Left lower leg: No edema. Comments: No tenderness over midline with palpation. Skin:     General: Skin is warm and dry. Capillary Refill: Capillary refill takes less than 2 seconds. Coloration: Skin is not jaundiced or pale. Findings: No erythema. Neurological:      General: No focal deficit present. Mental Status: He is alert and oriented to person, place, and time. Motor: No weakness. Coordination: Coordination normal.      Gait: Gait normal.   Psychiatric:         Mood and Affect: Mood normal.         Behavior: Behavior normal.         Thought Content: Thought content normal.         Judgment: Judgment normal.            Patient's past medical history, surgical history, family history, medications,  and allergies  were all reviewed and updated as appropriate today.     Patient Active Problem List   Diagnosis    HTN (hypertension)    Paroxysmal atrial fibrillation (HCC)    S/P ablation of atrial fibrillation    Ventral hernia    GERD (gastroesophageal reflux disease)    Left atrial dilation    Mitral regurgitation    ART on CPAP     Past Medical History:   Diagnosis Date    Atrial fibrillation (HCC)     Atrial fibrillation ([address]     CAD (coronary artery disease)     ablation, at fib    Chicken pox     Hypertension     Nonischemic cardiomyopathy ([address] 3/29/2011    ART (obstructive sleep apnea)     Sleep apnea     wears a cpap    Wide-complex tachycardia ([address] 3/29/2011      Past Surgical History:   Procedure Laterality Date    ABLATION OF DYSRHYTHMIC FOCUS      CARDIAC SURGERY      stent    COLONOSCOPY  4/13/2015    Diverticulosis    CYST INCISION AND DRAINAGE      MRSA infection    UPPER GASTROINTESTINAL ENDOSCOPY  8/22/2012       Family History   Problem Relation Age of Onset    Cancer Mother         breast      Allergies   Allergen Reactions    Amoxicillin     Amoxicillin     Penicillins        This chart was generated using the Dragon dictation system. I created this record but it may contain dictation errors due to the limitation of the software.

## 2021-09-14 ENCOUNTER — APPOINTMENT (OUTPATIENT)
Dept: CT IMAGING | Age: 61
DRG: 310 | End: 2021-09-14
Payer: COMMERCIAL

## 2021-09-14 ENCOUNTER — HOSPITAL ENCOUNTER (INPATIENT)
Age: 61
LOS: 1 days | Discharge: HOME | DRG: 310 | End: 2021-09-15
Attending: EMERGENCY MEDICINE | Admitting: INTERNAL MEDICINE
Payer: COMMERCIAL

## 2021-09-14 ENCOUNTER — APPOINTMENT (OUTPATIENT)
Dept: GENERAL RADIOLOGY | Age: 61
DRG: 310 | End: 2021-09-14
Payer: COMMERCIAL

## 2021-09-14 DIAGNOSIS — I47.20 V-TACH: ICD-10-CM

## 2021-09-14 DIAGNOSIS — I48.91 ATRIAL FIBRILLATION WITH RAPID VENTRICULAR RESPONSE (HCC): Primary | ICD-10-CM

## 2021-09-14 LAB
A/G RATIO: 1.5 (ref 1.1–2.2)
ALBUMIN SERPL-MCNC: 4.6 G/DL (ref 3.4–5)
ALP BLD-CCNC: 77 U/L (ref 40–129)
ALT SERPL-CCNC: 28 U/L (ref 10–40)
ANION GAP SERPL CALCULATED.3IONS-SCNC: 11 MMOL/L (ref 3–16)
AST SERPL-CCNC: 21 U/L (ref 15–37)
ATYPICAL LYMPHOCYTE RELATIVE PERCENT: 1 % (ref 0–6)
BANDED NEUTROPHILS RELATIVE PERCENT: 2 % (ref 0–7)
BASOPHILS ABSOLUTE: 0 K/UL (ref 0–0.2)
BASOPHILS RELATIVE PERCENT: 0 %
BILIRUB SERPL-MCNC: 0.5 MG/DL (ref 0–1)
BUN BLDV-MCNC: 24 MG/DL (ref 7–20)
CALCIUM SERPL-MCNC: 9.5 MG/DL (ref 8.3–10.6)
CHLORIDE BLD-SCNC: 98 MMOL/L (ref 99–110)
CO2: 30 MMOL/L (ref 21–32)
CREAT SERPL-MCNC: 1 MG/DL (ref 0.8–1.3)
EKG ATRIAL RATE: 182 BPM
EKG DIAGNOSIS: NORMAL
EKG Q-T INTERVAL: 298 MS
EKG QRS DURATION: 82 MS
EKG QTC CALCULATION (BAZETT): 435 MS
EKG R AXIS: -8 DEGREES
EKG T AXIS: -41 DEGREES
EKG VENTRICULAR RATE: 128 BPM
EOSINOPHILS ABSOLUTE: 0 K/UL (ref 0–0.6)
EOSINOPHILS RELATIVE PERCENT: 0 %
GFR AFRICAN AMERICAN: >60
GFR NON-AFRICAN AMERICAN: >60
GLOBULIN: 3 G/DL
GLUCOSE BLD-MCNC: 107 MG/DL (ref 70–99)
HCT VFR BLD CALC: 43.6 % (ref 40.5–52.5)
HEMOGLOBIN: 15.4 G/DL (ref 13.5–17.5)
LYMPHOCYTES ABSOLUTE: 2.4 K/UL (ref 1–5.1)
LYMPHOCYTES RELATIVE PERCENT: 23 %
MAGNESIUM: 2.1 MG/DL (ref 1.8–2.4)
MCH RBC QN AUTO: 29.4 PG (ref 26–34)
MCHC RBC AUTO-ENTMCNC: 35.3 G/DL (ref 31–36)
MCV RBC AUTO: 83.3 FL (ref 80–100)
MONOCYTES ABSOLUTE: 1.3 K/UL (ref 0–1.3)
MONOCYTES RELATIVE PERCENT: 13 %
NEUTROPHILS ABSOLUTE: 6.4 K/UL (ref 1.7–7.7)
NEUTROPHILS RELATIVE PERCENT: 61 %
OVALOCYTES: ABNORMAL
PDW BLD-RTO: 13.7 % (ref 12.4–15.4)
PLATELET # BLD: 177 K/UL (ref 135–450)
PMV BLD AUTO: 7.6 FL (ref 5–10.5)
POTASSIUM SERPL-SCNC: 3.2 MMOL/L (ref 3.5–5.1)
RBC # BLD: 5.24 M/UL (ref 4.2–5.9)
SODIUM BLD-SCNC: 139 MMOL/L (ref 136–145)
TOTAL PROTEIN: 7.6 G/DL (ref 6.4–8.2)
TROPONIN: <0.01 NG/ML
WBC # BLD: 10.1 K/UL (ref 4–11)

## 2021-09-14 PROCEDURE — 2580000003 HC RX 258: Performed by: EMERGENCY MEDICINE

## 2021-09-14 PROCEDURE — 93005 ELECTROCARDIOGRAM TRACING: CPT | Performed by: EMERGENCY MEDICINE

## 2021-09-14 PROCEDURE — 71045 X-RAY EXAM CHEST 1 VIEW: CPT

## 2021-09-14 PROCEDURE — 93010 ELECTROCARDIOGRAM REPORT: CPT | Performed by: INTERNAL MEDICINE

## 2021-09-14 PROCEDURE — 2500000003 HC RX 250 WO HCPCS

## 2021-09-14 PROCEDURE — 2500000003 HC RX 250 WO HCPCS: Performed by: EMERGENCY MEDICINE

## 2021-09-14 PROCEDURE — 96365 THER/PROPH/DIAG IV INF INIT: CPT

## 2021-09-14 PROCEDURE — 80053 COMPREHEN METABOLIC PANEL: CPT

## 2021-09-14 PROCEDURE — 84484 ASSAY OF TROPONIN QUANT: CPT

## 2021-09-14 PROCEDURE — 96376 TX/PRO/DX INJ SAME DRUG ADON: CPT

## 2021-09-14 PROCEDURE — 70450 CT HEAD/BRAIN W/O DYE: CPT

## 2021-09-14 PROCEDURE — 99284 EMERGENCY DEPT VISIT MOD MDM: CPT

## 2021-09-14 PROCEDURE — 93005 ELECTROCARDIOGRAM TRACING: CPT | Performed by: PHYSICIAN ASSISTANT

## 2021-09-14 PROCEDURE — 6370000000 HC RX 637 (ALT 250 FOR IP): Performed by: EMERGENCY MEDICINE

## 2021-09-14 PROCEDURE — 1200000000 HC SEMI PRIVATE

## 2021-09-14 PROCEDURE — 83735 ASSAY OF MAGNESIUM: CPT

## 2021-09-14 PROCEDURE — 85025 COMPLETE CBC W/AUTO DIFF WBC: CPT

## 2021-09-14 RX ORDER — PROPAFENONE HYDROCHLORIDE 150 MG/1
150 TABLET, FILM COATED ORAL ONCE
Status: COMPLETED | OUTPATIENT
Start: 2021-09-14 | End: 2021-09-14

## 2021-09-14 RX ORDER — DILTIAZEM HYDROCHLORIDE 5 MG/ML
20 INJECTION INTRAVENOUS ONCE
Status: CANCELLED | OUTPATIENT
Start: 2021-09-14 | End: 2021-09-14

## 2021-09-14 RX ORDER — DILTIAZEM HYDROCHLORIDE 5 MG/ML
10 INJECTION INTRAVENOUS ONCE
Status: COMPLETED | OUTPATIENT
Start: 2021-09-14 | End: 2021-09-14

## 2021-09-14 RX ORDER — CARVEDILOL 3.12 MG/1
12.5 TABLET ORAL ONCE
Status: DISCONTINUED | OUTPATIENT
Start: 2021-09-14 | End: 2021-09-14

## 2021-09-14 RX ORDER — POTASSIUM CHLORIDE 20 MEQ/1
40 TABLET, EXTENDED RELEASE ORAL ONCE
Status: COMPLETED | OUTPATIENT
Start: 2021-09-14 | End: 2021-09-15

## 2021-09-14 RX ORDER — DILTIAZEM HYDROCHLORIDE 5 MG/ML
INJECTION INTRAVENOUS
Status: COMPLETED
Start: 2021-09-14 | End: 2021-09-14

## 2021-09-14 RX ORDER — DILTIAZEM HYDROCHLORIDE 5 MG/ML
20 INJECTION INTRAVENOUS ONCE
Status: COMPLETED | OUTPATIENT
Start: 2021-09-14 | End: 2021-09-14

## 2021-09-14 RX ADMIN — DILTIAZEM HYDROCHLORIDE 20 MG: 5 INJECTION INTRAVENOUS at 14:44

## 2021-09-14 RX ADMIN — DILTIAZEM HYDROCHLORIDE 10 MG: 5 INJECTION INTRAVENOUS at 21:26

## 2021-09-14 RX ADMIN — DILTIAZEM HYDROCHLORIDE 5 MG/HR: 5 INJECTION, SOLUTION INTRAVENOUS at 21:43

## 2021-09-14 RX ADMIN — APIXABAN 5 MG: 5 TABLET, FILM COATED ORAL at 21:25

## 2021-09-14 RX ADMIN — PROPAFENONE HYDROCHLORIDE 150 MG: 150 TABLET, FILM COATED ORAL at 19:52

## 2021-09-14 NOTE — ED NOTES
1717 - called [facility] pt placement to reach cardiologist on-call  Re: uzma rvr  1727 - Dr Regina Benavidez called back to speak with KRYSTIAN Mcgowan  09/14/21 1723

## 2021-09-14 NOTE — ED PROVIDER NOTES
I independently performed a history and physical on Wallace Carroll III. All diagnostic, treatment, and disposition decisions were made by myself in conjunction with the advanced practice provider. For further details of Wallace Carroll III's emergency department encounter, please see KRYSTIAN Blair's documentation. Patient is a 44-year-old male presenting today due to concern for 2 days of feeling mild headache with lightheadedness with standing and generalized fatigue. He initially thought that maybe had something wrong with his ears and did not think much of it until he finally realized his pulse was fast and that he was in atrial fibrillation with rapid ventricular response. He has had similar symptoms with A. fib in the past.  He denies any chest pain or shortness of breath. He denies any unilateral numbness or weakness. No fever. He has had cardiac ablations for atrial fibrillation and normally is not in this rhythm. He does take Eliquis and denies missing any doses recently. He states that if he does go into atrial fibrillation and realizes that, he normally tries to take propafenone which does seem to help and he believes the dose is 150 mg. He does not take this medication on a regular basis and only as needed. He denies any visual changes or hearing changes. No abdominal pain or vomiting or diarrhea. No trouble ambulating. No other concerns at this point other than persistently elevated heart rate although when I spoke to him he denied feeling lightheaded. Physical:   Gen: No acute distress. AOx3.   Psych: Normal mood and affect  HEENT: NCAT, EOMI, PERRL, MMM  Neck: supple, normal ROM  Cardiac: Tachycardia, irregularly irregular rhythm, pulses 2+ in upper extremities  Lungs: C2AB, no R/R/W  Abdomen: soft and nontender with no R/D/G  Neuro: no focal neuro deficits with strength and sensation 5/5 in all 4 extremities, normal ambulation, no ataxia, normal coordination, GCS equals

## 2021-09-14 NOTE — ED PROVIDER NOTES
[facility] Emergency Department    CHIEF COMPLAINT  Headache (pt reporting \"head pressure\" on either side of his head for several days. states he's been dealing with some nerve issues sees neurology next week. ), Dizziness (pt reporting he feels \"light headed\" when he moves around or sits for a long time. pt reporting fatigue and \"brain fog\"  for the past several days ), and Fatigue      SHARED SERVICE VISIT  I have seen and evaluated this patient with my supervising physician, Dr. Richa Bryant. HISTORY OF PRESENT ILLNESS  Alton Salgado III is a 64 y.o. male who presents to the ED complaining of several day history of bilateral head pressure and \"brain fog\". Patient drove himself in for evaluation. Patient states that he has had some pressure behind the ears bilaterally for the past several days. Denies headaches or confusion. No ear pain. No nasal congestion or sore throat. He has had no visual changes or disturbances. No difficulty speaking or swallowing. Denies any numbness, tingling, weakness of extremity. No neck pain. No chest pain or shortness of breath. Did report episode of lightheadedness today upon standing. Does have history of A. fib. No leg pain or swelling. Denies urinary symptoms. No fevers or chills. No other complaints, modifying factors or associated symptoms. Nursing notes reviewed.    Past Medical History:   Diagnosis Date    Atrial fibrillation ([address]     Atrial fibrillation (HCC)     CAD (coronary artery disease)     ablation, at fib    Chicken pox     Hypertension     Nonischemic cardiomyopathy ([address] 3/29/2011    ART (obstructive sleep apnea)     Sleep apnea     wears a cpap    Wide-complex tachycardia ([address] 3/29/2011     Past Surgical History:   Procedure Laterality Date    ABLATION OF DYSRHYTHMIC FOCUS      CARDIAC SURGERY      stent    COLONOSCOPY  4/13/2015    Diverticulosis    CYST INCISION AND DRAINAGE      MRSA infection    UPPER GASTROINTESTINAL ENDOSCOPY  8/22/2012     Family History   Problem Relation Age of Onset    Cancer Mother         breast     Social History     Socioeconomic History    Marital status: Single     Spouse name: Not on file    Number of children: Not on file    Years of education: Not on file    Highest education level: Not on file   Occupational History    Not on file   Tobacco Use    Smoking status: Never Smoker    Smokeless tobacco: Never Used   Vaping Use    Vaping Use: Never used   Substance and Sexual Activity    Alcohol use: No     Comment: 1/mthh    Drug use: No    Sexual activity: Never   Other Topics Concern    Not on file   Social History Narrative    ** Merged History Encounter **          Social Determinants of Health     Financial Resource Strain: Low Risk     Difficulty of Paying Living Expenses: Not hard at all   Food Insecurity: No Food Insecurity    Worried About [address] [employer] in the Last Year: Never true    [address] in the Last Year: Never true   Transportation Needs:     Lack of Transportation (Medical):      Lack of Transportation (Non-Medical):    Physical Activity:     Days of Exercise per Week:     Minutes of Exercise per Session:    Stress:     Feeling of Stress :    Social Connections:     Frequency of Communication with Friends and Family:     Frequency of Social Gatherings with Friends and Family:     Attends Hoahaoism Services:     Active Member of Clubs or Organizations:     Attends Club or Organization Meetings:     Marital Status:    Intimate Partner Violence:     Fear of Current or Ex-Partner:     Emotionally Abused:     Physically Abused:     Sexually Abused:      Current Facility-Administered Medications   Medication Dose Route Frequency Provider Last Rate Last Admin    dilTIAZem 25 MG/5ML injection             dilTIAZem injection 20 mg  20 mg IntraVENous Once Michele Denver, [address]         Current Outpatient Medications   Medication Sig Dispense Refill    methylPREDNISolone (MEDROL, MARJAN,) 4 MG tablet Take by mouth. Take 6 tabs on day 1, 5 tabs on day 2, 4 tabs on day 3, 3 tabs on day 4, 2 tabs on day 5, 1 tab on day 6 21 tablet 0    amLODIPine (NORVASC) 10 MG tablet Take 10 mg by mouth daily      hydroCHLOROthiazide (HYDRODIURIL) 25 MG tablet       apixaban (ELIQUIS) 5 MG TABS tablet TAKE ONE TABLET BY MOUTH TWICE A DAY      carvedilol (COREG) 12.5 MG tablet Take 12.5 mg by mouth 2 times daily (with meals).  minocycline (MINOCIN;DYNACIN) 100 MG capsule Take 100 mg by mouth daily.  lisinopril (PRINIVIL;ZESTRIL) 40 MG tablet Take 40 mg by mouth daily. Allergies   Allergen Reactions    Amoxicillin     Amoxicillin     Penicillins        REVIEW OF SYSTEMS  10 systems reviewed, pertinent positives per HPI otherwise noted to be negative    PHYSICAL EXAM  /85   Pulse 126   Temp 98.2 °F (36.8 °C) (Oral)   Resp 20   Ht 6' 3\" (1.905 m)   Wt 221 lb (100.2 kg)   SpO2 98%   BMI 27.62 kg/m²   GENERAL APPEARANCE: Awake and alert. Cooperative. No acute distress. HEAD: Normocephalic. Atraumatic. EYES: PERRL. EOM's grossly intact. ENT: Mucous membranes are moist.  No mastoid tenderness. Canals are clear without edema or exudate. NECK: Supple. No meningismus. HEART: Tachycardic and irregularly irregular. No murmurs. LUNGS: Respirations unlabored. CTAB. Good air exchange. Speaking comfortably in full sentences. ABDOMEN: Soft. Non-distended. Non-tender. No guarding or rebound. No midline pulsatile mass. EXTREMITIES: No peripheral edema. No unilateral calf pain, redness or swelling. Moves all extremities equally. All extremities neurovascularly intact. SKIN: Warm and dry. No acute rashes. NEUROLOGICAL: Alert and oriented. CN's 2-12 intact. No gross facial drooping. Strength 5/5, sensation intact. PSYCHIATRIC: Normal mood and affect.     RADIOLOGY  CT HEAD WO CONTRAST    Result Date: 9/14/2021  EXAMINATION: CT OF THE HEAD WITHOUT CONTRAST  9/14/2021 2:58 pm TECHNIQUE: CT of the head was performed without the administration of intravenous contrast. Dose modulation, iterative reconstruction, and/or weight based adjustment of the mA/kV was utilized to reduce the radiation dose to as low as reasonably achievable. COMPARISON: 08/12/2010 HISTORY: ORDERING SYSTEM PROVIDED HISTORY: dizzy TECHNOLOGIST PROVIDED HISTORY: Reason for exam:->dizzy Has a \"code stroke\" or \"stroke alert\" been called? ->No Decision Support Exception - unselect if not a suspected or confirmed emergency medical condition->Emergency Medical Condition (MA) Reason for Exam: lightheadness x3-4 days, c/o head pressure in back of head Acuity: Acute Type of Exam: Initial Relevant Medical/Surgical History: no hx of stroke or seizure FINDINGS: BRAIN/VENTRICLES: Ventricles are stable in size, shape, and position. No intracerebral masses are identified. No mass effect. No midline shift. No acute intracranial hemorrhage is seen. There is intracranial atherosclerosis. .  There is subtle periventricular hypodensity seen. .  Basal ganglia calcifications are seen ORBITS: The visualized portion of the orbits demonstrate no acute abnormality. SINUSES: No significant mastoid opacification noted. Trace mucosal thickening is seen in the ethmoid air cells. There is bowing and spurring of the nasal septum SOFT TISSUES/SKULL:  Spurring is seen at the tip of the dens     No acute intracranial abnormality. Subtle small-vessel ischemic change, similar to prior     XR CHEST PORTABLE    Result Date: 9/14/2021  EXAMINATION: ONE XRAY VIEW OF THE CHEST 9/14/2021 2:31 pm COMPARISON: February 23, 2013 HISTORY: ORDERING SYSTEM PROVIDED HISTORY: dizzy TECHNOLOGIST PROVIDED HISTORY: Reason for exam:->dizzy Reason for Exam: Headache (pt reporting \"head pressure\" on either side of his head for several days.  states he's been dealing with some nerve issues sees neurology next week. ) Acuity: Acute Type of Exam: Initial FINDINGS: The cardiomediastinal silhouette is normal in size. The lungs are clear. No pleural effusion or pneumothorax is present. No acute cardiopulmonary process. ED COURSE  Pain control was not required while here in the emergency department. Triage vitals stable. Patient did develop tachycardia to 126. EKG consistent A. fib RVR at a rate of 128. Given diltiazem bolus. CBC without leukocytosis or anemia. Stable portable chest x-ray. CBC without leukocytosis or anemia. CMP mild hypokalemia 3.2. Troponin less than 0.01. Normal magnesium. INR within normal limits. Stable portable chest x-ray. Head CT without acute pathologic process. I when getting up to move patient remains in A. fib RVR. Discussed case with patient's on-call cardiologist who recommended attempting cardioversion here and discharge home if successful. Dr. Colletta Ridge to evaluate. Please refer to Dr. Malina Aguilar documentation regarding ED course, evaluation, treatment, medical decision making, and disposition for this patient. CRITICAL CARE TIME  30 Minutes of critical care time spent not including separately billable procedures.     MDM  Results for orders placed or performed during the hospital encounter of 09/14/21   CBC auto differential   Result Value Ref Range    WBC 10.1 4.0 - 11.0 K/uL    RBC 5.24 4.20 - 5.90 M/uL    Hemoglobin 15.4 13.5 - 17.5 g/dL    Hematocrit 43.6 40.5 - 52.5 %    MCV 83.3 80.0 - 100.0 fL    MCH 29.4 26.0 - 34.0 pg    MCHC 35.3 31.0 - 36.0 g/dL    RDW 13.7 12.4 - 15.4 %    Platelets 527 867 - 107 K/uL    MPV 7.6 5.0 - 10.5 fL    Neutrophils % 61.0 %    Lymphocytes % 23.0 %    Monocytes % 13.0 %    Eosinophils % 0.0 %    Basophils % 0.0 %    Neutrophils Absolute 6.4 1.7 - 7.7 K/uL    Lymphocytes Absolute 2.4 1.0 - 5.1 K/uL    Monocytes Absolute 1.3 0.0 - 1.3 K/uL    Eosinophils Absolute 0.0 0.0 - 0.6 K/uL    Basophils Absolute 0.0 0.0 - 0.2 K/uL    Bands Relative 2 0 - 7 % Atypical Lymphocytes Relative 1 0 - 6 %    Ovalocytes Occasional (A)    Comprehensive metabolic panel   Result Value Ref Range    Sodium 139 136 - 145 mmol/L    Potassium 3.2 (L) 3.5 - 5.1 mmol/L    Chloride 98 (L) 99 - 110 mmol/L    CO2 30 21 - 32 mmol/L    Anion Gap 11 3 - 16    Glucose 107 (H) 70 - 99 mg/dL    BUN 24 (H) 7 - 20 mg/dL    CREATININE 1.0 0.8 - 1.3 mg/dL    GFR Non-African American >60 >60    GFR African American >60 >60    Calcium 9.5 8.3 - 10.6 mg/dL    Total Protein 7.6 6.4 - 8.2 g/dL    Albumin 4.6 3.4 - 5.0 g/dL    Albumin/Globulin Ratio 1.5 1.1 - 2.2    Total Bilirubin 0.5 0.0 - 1.0 mg/dL    Alkaline Phosphatase 77 40 - 129 U/L    ALT 28 10 - 40 U/L    AST 21 15 - 37 U/L    Globulin 3.0 g/dL   Troponin   Result Value Ref Range    Troponin <0.01 <0.01 ng/mL   Magnesium   Result Value Ref Range    Magnesium 2.10 1.80 - 2.40 mg/dL   Basic Metabolic Panel w/ Reflex to MG   Result Value Ref Range    Sodium 142 136 - 145 mmol/L    Potassium reflex Magnesium 3.5 3.5 - 5.1 mmol/L    Chloride 101 99 - 110 mmol/L    CO2 30 21 - 32 mmol/L    Anion Gap 11 3 - 16    Glucose 105 (H) 70 - 99 mg/dL    BUN 16 7 - 20 mg/dL    CREATININE 1.0 0.8 - 1.3 mg/dL    GFR Non-African American >60 >60    GFR African American >60 >60    Calcium 9.3 8.3 - 10.6 mg/dL   Protime-INR   Result Value Ref Range    Protime 14.7 (H) 9.9 - 12.7 sec    INR 1.29 (H) 0.88 - 1.12   Troponin   Result Value Ref Range    Troponin <0.01 <0.01 ng/mL   TSH with Reflex   Result Value Ref Range    TSH 2.27 0.27 - 4.20 uIU/mL   Troponin   Result Value Ref Range    Troponin <0.01 <0.01 ng/mL   Magnesium   Result Value Ref Range    Magnesium 2.00 1.80 - 2.40 mg/dL   EKG 12 Lead   Result Value Ref Range    Ventricular Rate 128 BPM    Atrial Rate 182 BPM    QRS Duration 82 ms    Q-T Interval 298 ms    QTc Calculation (Bazett) 435 ms    R Axis -8 degrees    T Axis -41 degrees    Diagnosis       Atrial fibrillation with rapid ventricular responseNonspecific ST and T wave abnormalityAbnormal ECGWhen compared with ECG of 24-FEB-2013 10:22,Atrial fibrillation has replaced Sinus rhythmVent. rate has increased BY  61 BPMConfirmed by MERLE Draper MD ([id number]) on 9/14/2021 2:52:50 PM   EKG 12 Lead   Result Value Ref Range    Ventricular Rate 149 BPM    Atrial Rate 166 BPM    QRS Duration 78 ms    Q-T Interval 310 ms    QTc Calculation (Bazett) 488 ms    R Axis -28 degrees    T Axis -24 degrees    Diagnosis       Atrial fibrillation with rapid ventricular responseBaseline artifactNonspecific ST and T wave abnormalityAbnormal ECGWhen compared with ECG of 14-SEP-2021 14:25,No significant change was foundConfirmed by MERLE Draper MD ([id number]) on 9/15/2021 7:33:58 AM   EKG 12 Lead   Result Value Ref Range    Ventricular Rate 69 BPM    Atrial Rate 69 BPM    P-R Interval 144 ms    QRS Duration 84 ms    Q-T Interval 410 ms    QTc Calculation (Bazett) 439 ms    P Axis 25 degrees    R Axis -23 degrees    T Axis -28 degrees    Diagnosis       Normal sinus rhythmNonspecific ST abnormalityAbnormal ECGWhen compared with ECG of 14-SEP-2021 20:43,Sinus rhythm has replaced Atrial fibrillationVent. rate has decreased BY  80 BPMConfirmed by MERLE ARNOLD MD ([id number]) on 9/15/2021 1:59:17 PM     I spoke with Kanchan Ch. We thoroughly discussed the history, physical exam, laboratory and imaging studies, as well as, emergency department course. Based upon that discussion, we've decided to admit Lisa Webb III to the hospital for further observation, evaluation and treatment. Final Impression  1. Atrial fibrillation with rapid ventricular response (HCC)    2. V-tach (HCC)      Blood pressure 120/84, pulse 63, temperature 97.8 °F (36.6 °C), temperature source Oral, resp. rate 16, height 6' 3\" (1.905 m), weight 224 lb 1.6 oz (101.7 kg), SpO2 97 %. DISPOSITION  Patient was admitted to the hospital in stable condition.           Mitra Hornitos, [place]  09/17/21 [address] [facility],[address]

## 2021-09-15 ENCOUNTER — APPOINTMENT (OUTPATIENT)
Dept: CARDIAC CATH/INVASIVE PROCEDURES | Age: 61
DRG: 310 | End: 2021-09-15
Payer: COMMERCIAL

## 2021-09-15 VITALS
RESPIRATION RATE: 16 BRPM | BODY MASS INDEX: 27.86 KG/M2 | OXYGEN SATURATION: 97 % | HEART RATE: 63 BPM | DIASTOLIC BLOOD PRESSURE: 84 MMHG | TEMPERATURE: 97.8 F | WEIGHT: 224.1 LBS | SYSTOLIC BLOOD PRESSURE: 120 MMHG | HEIGHT: 75 IN

## 2021-09-15 LAB
ANION GAP SERPL CALCULATED.3IONS-SCNC: 11 MMOL/L (ref 3–16)
BUN BLDV-MCNC: 16 MG/DL (ref 7–20)
CALCIUM SERPL-MCNC: 9.3 MG/DL (ref 8.3–10.6)
CHLORIDE BLD-SCNC: 101 MMOL/L (ref 99–110)
CO2: 30 MMOL/L (ref 21–32)
CREAT SERPL-MCNC: 1 MG/DL (ref 0.8–1.3)
EKG ATRIAL RATE: 166 BPM
EKG ATRIAL RATE: 69 BPM
EKG DIAGNOSIS: NORMAL
EKG DIAGNOSIS: NORMAL
EKG P AXIS: 25 DEGREES
EKG P-R INTERVAL: 144 MS
EKG Q-T INTERVAL: 310 MS
EKG Q-T INTERVAL: 410 MS
EKG QRS DURATION: 78 MS
EKG QRS DURATION: 84 MS
EKG QTC CALCULATION (BAZETT): 439 MS
EKG QTC CALCULATION (BAZETT): 488 MS
EKG R AXIS: -23 DEGREES
EKG R AXIS: -28 DEGREES
EKG T AXIS: -24 DEGREES
EKG T AXIS: -28 DEGREES
EKG VENTRICULAR RATE: 149 BPM
EKG VENTRICULAR RATE: 69 BPM
GFR AFRICAN AMERICAN: >60
GFR NON-AFRICAN AMERICAN: >60
GLUCOSE BLD-MCNC: 105 MG/DL (ref 70–99)
INR BLD: 1.29 (ref 0.88–1.12)
MAGNESIUM: 2 MG/DL (ref 1.8–2.4)
POTASSIUM REFLEX MAGNESIUM: 3.5 MMOL/L (ref 3.5–5.1)
PROTHROMBIN TIME: 14.7 SEC (ref 9.9–12.7)
SODIUM BLD-SCNC: 142 MMOL/L (ref 136–145)
TROPONIN: <0.01 NG/ML
TROPONIN: <0.01 NG/ML
TSH REFLEX: 2.27 UIU/ML (ref 0.27–4.2)

## 2021-09-15 PROCEDURE — 92960 CARDIOVERSION ELECTRIC EXT: CPT

## 2021-09-15 PROCEDURE — 6360000002 HC RX W HCPCS

## 2021-09-15 PROCEDURE — 5A2204Z RESTORATION OF CARDIAC RHYTHM, SINGLE: ICD-10-PCS | Performed by: INTERNAL MEDICINE

## 2021-09-15 PROCEDURE — 99223 1ST HOSP IP/OBS HIGH 75: CPT | Performed by: INTERNAL MEDICINE

## 2021-09-15 PROCEDURE — 84443 ASSAY THYROID STIM HORMONE: CPT

## 2021-09-15 PROCEDURE — 92960 CARDIOVERSION ELECTRIC EXT: CPT | Performed by: INTERNAL MEDICINE

## 2021-09-15 PROCEDURE — 85610 PROTHROMBIN TIME: CPT

## 2021-09-15 PROCEDURE — 2500000003 HC RX 250 WO HCPCS

## 2021-09-15 PROCEDURE — 99152 MOD SED SAME PHYS/QHP 5/>YRS: CPT | Performed by: INTERNAL MEDICINE

## 2021-09-15 PROCEDURE — 6370000000 HC RX 637 (ALT 250 FOR IP): Performed by: NURSE PRACTITIONER

## 2021-09-15 PROCEDURE — 84484 ASSAY OF TROPONIN QUANT: CPT

## 2021-09-15 PROCEDURE — 93010 ELECTROCARDIOGRAM REPORT: CPT | Performed by: INTERNAL MEDICINE

## 2021-09-15 PROCEDURE — 83735 ASSAY OF MAGNESIUM: CPT

## 2021-09-15 PROCEDURE — 93005 ELECTROCARDIOGRAM TRACING: CPT | Performed by: INTERNAL MEDICINE

## 2021-09-15 PROCEDURE — 80048 BASIC METABOLIC PNL TOTAL CA: CPT

## 2021-09-15 RX ORDER — LISINOPRIL 20 MG/1
40 TABLET ORAL DAILY
Status: DISCONTINUED | OUTPATIENT
Start: 2021-09-15 | End: 2021-09-15 | Stop reason: HOSPADM

## 2021-09-15 RX ORDER — ACETAMINOPHEN 325 MG/1
650 TABLET ORAL EVERY 6 HOURS PRN
Status: DISCONTINUED | OUTPATIENT
Start: 2021-09-15 | End: 2021-09-15 | Stop reason: HOSPADM

## 2021-09-15 RX ORDER — AMLODIPINE BESYLATE 5 MG/1
10 TABLET ORAL DAILY
Status: DISCONTINUED | OUTPATIENT
Start: 2021-09-15 | End: 2021-09-15 | Stop reason: HOSPADM

## 2021-09-15 RX ORDER — POTASSIUM CHLORIDE 20 MEQ/1
40 TABLET, EXTENDED RELEASE ORAL PRN
Status: DISCONTINUED | OUTPATIENT
Start: 2021-09-15 | End: 2021-09-15 | Stop reason: HOSPADM

## 2021-09-15 RX ORDER — CARVEDILOL 3.12 MG/1
12.5 TABLET ORAL 2 TIMES DAILY WITH MEALS
Status: DISCONTINUED | OUTPATIENT
Start: 2021-09-15 | End: 2021-09-15 | Stop reason: HOSPADM

## 2021-09-15 RX ORDER — ACETAMINOPHEN 650 MG/1
650 SUPPOSITORY RECTAL EVERY 6 HOURS PRN
Status: DISCONTINUED | OUTPATIENT
Start: 2021-09-15 | End: 2021-09-15 | Stop reason: HOSPADM

## 2021-09-15 RX ORDER — POTASSIUM CHLORIDE 20 MEQ/1
20 TABLET, EXTENDED RELEASE ORAL DAILY
Qty: 30 TABLET | Refills: 0 | Status: SHIPPED | OUTPATIENT
Start: 2021-09-15

## 2021-09-15 RX ORDER — ONDANSETRON 4 MG/1
4 TABLET, ORALLY DISINTEGRATING ORAL EVERY 8 HOURS PRN
Status: DISCONTINUED | OUTPATIENT
Start: 2021-09-15 | End: 2021-09-15 | Stop reason: HOSPADM

## 2021-09-15 RX ORDER — SODIUM CHLORIDE 0.9 % (FLUSH) 0.9 %
10 SYRINGE (ML) INJECTION EVERY 12 HOURS SCHEDULED
Status: DISCONTINUED | OUTPATIENT
Start: 2021-09-15 | End: 2021-09-15 | Stop reason: HOSPADM

## 2021-09-15 RX ORDER — SODIUM CHLORIDE 9 MG/ML
25 INJECTION, SOLUTION INTRAVENOUS PRN
Status: DISCONTINUED | OUTPATIENT
Start: 2021-09-15 | End: 2021-09-15 | Stop reason: HOSPADM

## 2021-09-15 RX ORDER — POTASSIUM CHLORIDE 7.45 MG/ML
10 INJECTION INTRAVENOUS PRN
Status: DISCONTINUED | OUTPATIENT
Start: 2021-09-15 | End: 2021-09-15 | Stop reason: HOSPADM

## 2021-09-15 RX ORDER — ONDANSETRON 2 MG/ML
4 INJECTION INTRAMUSCULAR; INTRAVENOUS EVERY 6 HOURS PRN
Status: DISCONTINUED | OUTPATIENT
Start: 2021-09-15 | End: 2021-09-15

## 2021-09-15 RX ORDER — SODIUM CHLORIDE 0.9 % (FLUSH) 0.9 %
10 SYRINGE (ML) INJECTION PRN
Status: DISCONTINUED | OUTPATIENT
Start: 2021-09-15 | End: 2021-09-15 | Stop reason: HOSPADM

## 2021-09-15 RX ORDER — MAGNESIUM SULFATE 1 G/100ML
1000 INJECTION INTRAVENOUS PRN
Status: DISCONTINUED | OUTPATIENT
Start: 2021-09-15 | End: 2021-09-15 | Stop reason: HOSPADM

## 2021-09-15 RX ADMIN — CARVEDILOL 12.5 MG: 3.12 TABLET, FILM COATED ORAL at 08:20

## 2021-09-15 RX ADMIN — APIXABAN 5 MG: 5 TABLET, FILM COATED ORAL at 08:20

## 2021-09-15 RX ADMIN — LISINOPRIL 40 MG: 20 TABLET ORAL at 08:19

## 2021-09-15 RX ADMIN — AMLODIPINE BESYLATE 10 MG: 5 TABLET ORAL at 08:20

## 2021-09-15 RX ADMIN — DRONEDARONE 400 MG: 400 TABLET, FILM COATED ORAL at 11:50

## 2021-09-15 RX ADMIN — POTASSIUM CHLORIDE 40 MEQ: 20 TABLET, EXTENDED RELEASE ORAL at 00:20

## 2021-09-15 NOTE — H&P
Hospital Medicine History & Physical      PCP: Ettaamae Holter, APRN - CNP    Date of Admission: 9/14/2021    Date of Service: Pt seen/examined on 9/14/2021 and Admitted to Inpatient with expected LOS greater than two midnights due to medical therapy. Chief Complaint:    Chief Complaint   Patient presents with    Headache     pt reporting \"head pressure\" on either side of his head for several days. states he's been dealing with some nerve issues sees neurology next week.  Dizziness     pt reporting he feels \"light headed\" when he moves around or sits for a long time. pt reporting fatigue and \"brain fog\"  for the past several days     Fatigue     History Of Present Illness:     64 y.o. male, with PMH of HTN, cardiomyopathy, A. fib, and ART, who presented to [facility] with headache, dizziness, and fatigue. History was obtained from the patient and review of the EMR. The patient states that he has been having the symptoms for the past several days. He decided to come into the ED for further evaluation as his symptoms progressively worsened. He was concerned that there was something going on in his brain. On arrival to the ED, the patient was noted to be in atrial fibrillation with rapid ventricular rate. The patient does follow [facility] EP group for this and was told in the past to take propafenone as needed for his atrial fibrillation. He was given a dose of this in the ED without resolution of his symptoms. The patient was also noted to have runs of vtach after this as well. He was subsequently started on diltiazem drip per ED physician. He will be admitted for further evaluation and cardiology evaluation.     The patient states that he follows with Dr. Reinier Terry (EP) and Dr. Julito Sosa (cardiology) from [facility]. States he had ablation done last about 10 years ago and wanted to discuss another ablation with his cardiologist. He follows very closely with his cardiologist and is very knowledgeable regarding his history with atrial fibrillation. Past Medical History:          Diagnosis Date    [address] ([facility] [address]     Atrial fibrillation (HCC)     CAD (coronary artery disease)     ablation, at fib    Chicken pox     Hypertension     Nonischemic cardiomyopathy ([facility] [facility] 3/29/2011    ART (obstructive sleep apnea)     Sleep apnea     wears a cpap    Wide-complex tachycardia ([facility] [address] [facility] 3/29/2011       Past Surgical History:          Procedure Laterality Date    ABLATION OF DYSRHYTHMIC FOCUS      CARDIAC SURGERY      stent    COLONOSCOPY  4/13/2015    Diverticulosis    CYST INCISION AND DRAINAGE      MRSA infection    UPPER GASTROINTESTINAL ENDOSCOPY  8/22/2012       Medications Prior to Admission:      Prior to Admission medications    Medication Sig Start Date End Date Taking? Authorizing Provider   methylPREDNISolone (MEDROL, MARJAN,) 4 MG tablet Take by mouth. Take 6 tabs on day 1, 5 tabs on day 2, 4 tabs on day 3, 3 tabs on day 4, 2 tabs on day 5, 1 tab on day 6 9/8/21   OFE Huizar - CNP   amLODIPine (NORVASC) 10 MG tablet Take 10 mg by mouth daily    Historical Provider, MD   hydroCHLOROthiazide (HYDRODIURIL) 25 MG tablet  10/20/20   Historical Provider, MD   apixaban (ELIQUIS) 5 MG TABS tablet TAKE ONE TABLET BY MOUTH TWICE A DAY 2/13/18   Historical Provider, MD   carvedilol (COREG) 12.5 MG tablet Take 12.5 mg by mouth 2 times daily (with meals). Historical Provider, MD   minocycline (MINOCIN;DYNACIN) 100 MG capsule Take 100 mg by mouth daily. Historical Provider, MD   lisinopril (PRINIVIL;ZESTRIL) 40 MG tablet Take 40 mg by mouth daily. Historical Provider, MD       Allergies:  Amoxicillin, Amoxicillin, and Penicillins    Social History:      The patient currently lives independently. TOBACCO:   reports that he has never smoked. He has never used smokeless tobacco.  ETOH:   reports no history of alcohol use.       Family History:      Reviewed in detail. Positive as follows:        Problem Relation Age of Onset    Cancer Mother         breast       REVIEW OF SYSTEMS:   Pertinent positives as noted in the HPI. All other systems reviewed and negative. PHYSICAL EXAM PERFORMED:    /80   Pulse 94   Temp 98.2 °F (36.8 °C) (Oral)   Resp 21   Ht 6' 3\" (1.905 m)   Wt 221 lb (100.2 kg)   SpO2 97%   BMI 27.62 kg/m²     General appearance:  No apparent distress, appears stated age and cooperative. HEENT:  Normal cephalic, atraumatic without obvious deformity. Pupils equal, round, and reactive to light. Extra ocular muscles intact. Conjunctivae/corneas clear. Neck: Supple, with full range of motion. No jugular venous distention. Trachea midline. Respiratory:  Normal respiratory effort. Clear to auscultation, bilaterally without Rales/Wheezes/Rhonchi. Cardiovascular:  Irregularly irregular rate and rhythm without murmurs, rubs or gallops. Abdomen: Soft, non-tender, non-distended with normal bowel sounds. Musculoskeletal:  No clubbing, cyanosis or edema bilaterally. Full range of motion without deformity. Skin: Skin color, texture, turgor normal.  No rashes or lesions. Neurologic:  Neurovascularly intact without any focal sensory/motor deficits. Cranial nerves: II-XII intact, grossly non-focal.  Psychiatric:  Alert and oriented, thought content appropriate, normal insight  Capillary Refill: Brisk,< 3 seconds   Peripheral Pulses: +2 palpable, equal bilaterally       Labs:     Recent Labs     09/14/21  1432   WBC 10.1   HGB 15.4   HCT 43.6        Recent Labs     09/14/21  1432      K 3.2*   CL 98*   CO2 30   BUN 24*   CREATININE 1.0   CALCIUM 9.5     Recent Labs     09/14/21  1432   AST 21   ALT 28   BILITOT 0.5   ALKPHOS 77     No results for input(s): INR in the last 72 hours.   Recent Labs     09/14/21  1432 09/15/21  0021   TROPONINI <0.01 <0.01       Urinalysis:      Lab Results   Component Value Date    NITRU Negative 04/19/2021 WBCUA 0-2 04/19/2021    RBCUA None seen 04/19/2021    BLOODU Negative 04/19/2021    SPECGRAV 1.020 04/19/2021    GLUCOSEU Negative 04/19/2021       Radiology:     CXR: I have reviewed the CXR with the following interpretation: No acute cardiopulmonary findings  EKG:  I have reviewed the EKG with the following interpretation: afib rvr    CT HEAD WO CONTRAST   Final Result   No acute intracranial abnormality. Subtle small-vessel ischemic change, similar to prior         XR CHEST PORTABLE   Final Result   No acute cardiopulmonary process. ASSESSMENT:    Active Hospital Problems    Diagnosis Date Noted    Atrial fibrillation with rapid ventricular response ([facility] [address] [I48.91] 09/14/2021         PLAN:    Atrial fibrillation with RVR, also with runs of Vtach. Hx of multiple ablations, cardioversions  - EKG: Afib RVR  - Rhythmol x1 given in ED  - TSH with reflex pending  - IV diltiazem drip started in the ED, continued for now  - Cardiology/EP consulted, thank you for recommendations  - 2D echo pending  - Telemetry monitoring  - Continue home eliquis, coreg    Nonischemic cardiomyopathy  - Last TTE 10/12/2020 per [facility]: Mildly dilated left ventricle. Mildly reduced systolic function. EF 47 to 51%. Grade 1 DD.  - Repeat TTE pending  - Daily weights, strict I's and O's  - Continue home bb, lisinopril, hctz    Essential HTN, controlled. - Continue home Norvasc, lisinopril, HCTZ  - Telemetry monitoring    Hypokalemia, 3.2 on admission  - Replete as needed  - Monitor with BMP    DVT Prophylaxis: Eliquis  Diet: ADULT DIET; Regular; Low Fat/Low Chol/High Fiber/2 gm Na  Code Status: Full Code    PT/OT Eval Status: Not ordered    Dispo -pending EP evaluation       Laxmi Hernandez NP    Thank you OFE Anne CNP for the opportunity to be involved in this patient's care.  If you have any questions or concerns please feel free to contact me at [phone].  -------------------------Anticipated cosigner, Dr. Torrance Goodpasture--------------------------

## 2021-09-15 NOTE — CONSULTS
Assessment/Plan:    Diagnoses and all orders for this visit:    Acute pharyngitis, unspecified etiology  -     POCT rapid strepA  -     Throat culture    Upper respiratory virus  -     cetirizine (ZyrTEC) oral solution; Take 5 mL (5 mg total) by mouth daily  -     fluticasone (FLONASE) 50 mcg/act nasal spray; 1 spray into each nostril daily        Patient Instructions   Rapid strep in office negative  Throat culture sent  Will follow up results and adjust treatment plan as needed  Advised symptomatic therapy with adequate fluid hydration and rest   May administer daily cetirizine and fluticasone nasal as directed  Follow up as needed for any persistent or worsening symptoms  Subjective:     History provided by: mother    Patient ID: Lance Villa is a 8 y o  female    Here with mother  Symptoms sore throat x 2 days with mild cough, nasal congestion  Fever Tmax 99  Last dose tylenol and Mucinex last evening at bedtime  +post nasal drip  No abdominal pain, vomiting or diarrhea  Appetite normal   No contact with suspected or confirmed case of CoVId 19 to date  The following portions of the patient's history were reviewed and updated as appropriate:   She  has a past medical history of B  pertussis (2017), Chin laceration (2013), Contact dermatitis (2016), Eczema, Middle ear effusion, Otitis media, Sleep disturbance, and Twin birth, mate liveborn, born in hospital, delivered by  delivery (2010)  She   Patient Active Problem List    Diagnosis Date Noted    Dermatitis, eczematoid 2017     She  reports that she has never smoked  She has never used smokeless tobacco  No history on file for alcohol and drug    Current Outpatient Medications   Medication Sig Dispense Refill    albuterol (PROVENTIL HFA,VENTOLIN HFA) 90 mcg/act inhaler INHALE 2 PUFFS BY MOUTH EVERY 4 HOURS AS NEEDED FOR WHEEZING OR SHORTNESS OF BREATH (Patient not taking: Reported on 2019) 18 Inhaler 1 Placed call to [address] [place] [address] Cardiology @ 2117  RE: Tom Carrillo per Dr. Roque Kaur, NP called back @ 2139  cetirizine (ZyrTEC) oral solution Take 5 mL (5 mg total) by mouth daily 150 mL 0    fluticasone (FLONASE) 50 mcg/act nasal spray 1 spray into each nostril daily 1 Bottle 0    Pediatric Multivit-Minerals-C (GUMMI BEAR MULTIVITAMIN/MIN) CHEW Chew       No current facility-administered medications for this visit  She has No Known Allergies       Review of Systems   Constitutional: Negative for appetite change, fatigue and fever  HENT: Positive for congestion and sore throat  Negative for ear pain, rhinorrhea and sneezing  Eyes: Negative for discharge and redness  Respiratory: Positive for cough  Negative for shortness of breath and wheezing  Cardiovascular: Negative for chest pain  Gastrointestinal: Negative for abdominal pain, constipation, diarrhea and vomiting  Genitourinary: Negative for decreased urine volume  Musculoskeletal: Negative for myalgias  Skin: Negative for pallor and rash  Allergic/Immunologic: Negative for environmental allergies and food allergies  Neurological: Negative for dizziness and headaches  Hematological: Negative for adenopathy  Psychiatric/Behavioral: Negative for behavioral problems and sleep disturbance  The patient is not hyperactive  Objective:    Vitals:    08/10/20 1502   BP: (!) 102/78   Pulse: 88   Resp: 22   Temp: 99 4 °F (37 4 °C)   Weight: 39 6 kg (87 lb 3 2 oz)       Physical Exam  Vitals signs reviewed  Constitutional:       General: She is active  Appearance: Normal appearance  She is well-developed and well-groomed  HENT:      Head: Normocephalic and atraumatic  Right Ear: Ear canal normal  Tympanic membrane is retracted (mild)  Left Ear: Ear canal normal  Tympanic membrane is retracted (mild)  Nose: Nose normal  No congestion  Mouth/Throat:      Mouth: Mucous membranes are moist       Pharynx: Oropharyngeal exudate (cobblestoning; clear post nasal drip) and posterior oropharyngeal erythema present  Tonsils: No tonsillar exudate  2+ on the right  2+ on the left  Eyes:      General: Lids are normal          Right eye: No discharge  Left eye: No discharge  Conjunctiva/sclera: Conjunctivae normal    Neck:      Musculoskeletal: Normal range of motion  Cardiovascular:      Rate and Rhythm: Regular rhythm  Heart sounds: Normal heart sounds, S1 normal and S2 normal  No murmur  Pulmonary:      Effort: Pulmonary effort is normal       Breath sounds: Normal breath sounds and air entry  No decreased breath sounds, wheezing or rhonchi  Musculoskeletal: Normal range of motion  Lymphadenopathy:      Cervical: No cervical adenopathy  Skin:     General: Skin is warm and dry  Findings: No rash  Neurological:      Mental Status: She is alert  Psychiatric:         Attention and Perception: Attention normal          Mood and Affect: Mood normal          Speech: Speech normal          Behavior: Behavior normal  Behavior is cooperative

## 2021-09-15 NOTE — PROCEDURES
[facility]     Electrophysiology Procedure Note       Date of Procedure: 9/15/2021  Patient's Name: Maximiliano Esposito  YOB: 1960   Medical Record Number: [id number]  Referring Physician: Fernanda Pinto MD  Procedure Performed by: Natan Kwong MD    Procedures performed:  · Anesthesia: Monitored Anesthesia Care  · Level of sedation plan: Moderate sedation (conscious sedation) with intravenous Midazolam 2 mg, and Methohexital 80 mg  · Start time: 0945  · Stop time: 0950  · Mallampati airway assessment class: II  · ASA class: II  · (there were no independent trained observers who had no other duties involved in this procedure)  · External Electrical cardioversion     Indication of the procedure: Symptomatic paroxysmal atrial fibrillation    Details of procedure: The patient was brought to the cath lab area in a fasting and non-sedated state. The risks, benefits and alternatives of the procedure were discussed with the patient. The patient opted to proceed with the procedure. Written informed consent was signed and placed in the chart. A timeout protocol was completed to identify the patient and the procedure being performed. Patient is on chronic anticoagulation therapy. The patient was monitored continuously with ECG, pulse oximetry, blood pressure monitoring, and direct observation. IV sedation was provided with IV Versed, and methohexital. Electrical DC cardioversion was performed using 200J, synchronized shock x 1. Patient was converted to sinus rhythm. Specimen collected: none    Estimated blood loss: none    The patient tolerated the procedure well and there were no complications. Conclusion:   Successful external DC cardioversion of symptomatic paroxysmal atrial fibrillation    Plan:   The patient can be discharged if remains stable. Will continue with apixaban, and carvedilol. Stop propafenone and start dronedarone.  The patient will follow-up with Dr. Edwina Rossi as

## 2021-09-15 NOTE — ED NOTES
Placed call to [facility] Cardiology @ 2117  RE: Karen Pineda per Dr. Marichuy Duke, NP called back @ 2139     Raudel Sanchez  09/15/21 9522

## 2021-09-15 NOTE — CONSULTS
Electrophysiology Consultation   Date: 9/15/2021  Admit Date:  9/14/2021  Reason for Consultation: Symptomatic paroxysmal atrial fibrillation  Consult Requesting Physician: Ingrid Gale MD     Chief Complaint   Patient presents with    Headache     pt reporting \"head pressure\" on either side of his head for several days. states he's been dealing with some nerve issues sees neurology next week.  Dizziness     pt reporting he feels \"light headed\" when he moves around or sits for a long time. pt reporting fatigue and \"brain fog\"  for the past several days     Fatigue     HPI:   Mr. Lisa Bhakta is a pleasant 64year old male with a medical history significant for symptomatic paroxysmal atrial fibrillation status post ablation (2010 and 2011) and on propafenone PRN, non-ischemic cardiomyopathy (suspected tachycardia induced) with recovered LVEF, hypertension, obstructive sleep apnea, and GERD who presents from home with lightheadedness and was found be in atrial fibrillation with RVR. Patient has a long history of atrial fibrillation was first diagnosed in 2008. He underwent multiple cardioversions before finally undergoing a catheter ablation for his paroxysmal atrial fibrillation no symptomatic 2010 and then 2011. Since then he has had very infrequent episodes the most recent one was in May 3, 2021. He subsequently followed up with his electrophysiologist, Dr. Beatris Dumas and an ablation was considered. Patient was doing well for so long he decided to hold off on this for now. Unfortunately sometime on Sunday he began to suffer from lightheadedness. This is usually how he manifested his atrial fibrillation with RVR and so he sought medical attention at [facility].     Patient denies fevers, chest pain, orthopnea, PND, lower extremity edema, abdominal swelling, shortness of breath, dyspnea on exertion, chills, visual changes, headaches, sore throat, cough, abdominal pain, nausea, vomiting, bleeding, bruising, dysuria, muscle/joint pain, confusion, depression, anxiety, skin lesions, etc.    Emergency Room/Hospital Course:  Patient is evaluated emergency room. His CMP was reassuring outside of mild hypokalemia. His CBC was reassuring. Troponins were negative x3. His head CT showed no acute pathology. His chest radiograph is reassuring. The physiology was consulted to help with further management of patient's atrial fibrillation. Current rhythm: Atrial fibrillation with RVR and short runs of nonsustained ventricular tachycardia. Known history of atrial fibrillation: yes -that is post multiple ablations  Valvular disease: No  Associated symptoms: Lightheadedness  Heart rate is not controlled  Previous cardioversion and/or ablation: yes -2010 and 2011  History of CAD: No  History of sleep apnea: Yes  History of ETOH abuse/drug abuse: No  History of thyroid disease: No  Elevated BNP: No  Left atrial size is Abnormal  Mildly dilated    Past Medical History:   Diagnosis Date    Atrial fibrillation ([address]     Atrial fibrillation (HCC)     CAD (coronary artery disease)     ablation, at fib    Chicken pox     Hypertension     Nonischemic cardiomyopathy ([address] 3/29/2011    ART (obstructive sleep apnea)     Sleep apnea     wears a cpap    Wide-complex tachycardia ([address] 3/29/2011        Past Surgical History:   Procedure Laterality Date    ABLATION OF DYSRHYTHMIC FOCUS      CARDIAC SURGERY      stent    COLONOSCOPY  4/13/2015    Diverticulosis    CYST INCISION AND DRAINAGE      MRSA infection    UPPER GASTROINTESTINAL ENDOSCOPY  8/22/2012       Allergies   Allergen Reactions    Amoxicillin     Amoxicillin     Penicillins        Social History:  Reviewed. reports that he has never smoked. He has never used smokeless tobacco. He reports that he does not drink alcohol and does not use drugs. Family History:  Reviewed. family history includes Cancer in his mother. No premature CAD.      Review of System:  All other systems reviewed except for that noted above. Pertinent negatives and positives are:     · General: negative for fever, chills   · Ophthalmic ROS: negative for - eye pain or loss of vision  · ENT ROS: negative for - headaches, sore throat   · Respiratory: negative for - cough, sputum  · Cardiovascular: Reviewed in HPI  · Gastrointestinal: negative for - abdominal pain, diarrhea, N/V  · Hematology: negative for - bleeding, blood clots, bruising or jaundice  · Genito-Urinary:  negative for - Dysuria or incontinence  · Musculoskeletal: negative for - Joint swelling, muscle pain  · Neurological: negative for - confusion, dizziness, headaches   · Psychiatric: No anxiety, no depression. · Dermatological: negative for - rash    Physical Examination:  Vitals:    09/15/21 0820   BP: (!) 131/90   Pulse:    Resp:    Temp:    SpO2:         Intake/Output Summary (Last 24 hours) at 9/15/2021 0830  Last data filed at 9/15/2021 0020  Gross per 24 hour   Intake --   Output 450 ml   Net -450 ml     In: -   Out: 450    Wt Readings from Last 3 Encounters:   21 221 lb (100.2 kg)   21 228 lb (103.4 kg)   21 226 lb (102.5 kg)     Temp  Av.2 °F (36.8 °C)  Min: 98.2 °F (36.8 °C)  Max: 98.2 °F (36.8 °C)  Pulse  Av.5  Min: 66  Max: 133  BP  Min: 110/80  Max: 154/99  SpO2  Av.6 %  Min: 93 %  Max: 99 %    · Telemetry: Atrial fibrillation with RVR. Nonsustained ventricular tachycardia. · Constitutional: Alert. Oriented to person, place, and time. No distress. · Head: Normocephalic and atraumatic. · Mouth/Throat: Lips appear moist. Oropharynx is clear and moist.  · Eyes: Conjunctivae normal. EOM are normal.   · Neck: Neck supple. No lymphadenopathy. No rigidity. No JVD present. · Cardiovascular: Tachycardic. Irregular rate and rhythm without murmur/gallop/regurgitation. · Pulmonary/Chest: Bilateral respiratory sounds present. No respiratory accessory muscle use.   No wheezes, No rhonchi. · Abdominal: Soft. Normal bowel sounds present. No distension, No tenderness. No splenomegaly. No hernia. · Musculoskeletal: No tenderness. No edema    · Neurological: Alert and oriented. Cranial nerve II-XII grossly intact. · Skin: Skin is warm and dry. No rash, lesions, ulcerations noted. · Psychiatric: No anxiety nor agitation. Labs:  Reviewed. Recent Labs     09/14/21  1432      K 3.2*   CL 98*   CO2 30   BUN 24*   CREATININE 1.0     Recent Labs     09/14/21  1432   WBC 10.1   HGB 15.4   HCT 43.6   MCV 83.3        Lab Results   Component Value Date    TROPONINI <0.01 09/15/2021     Lab Results   Component Value Date    .3 03/04/2010     Lab Results   Component Value Date    PROTIME 27.3 07/23/2013    PROTIME 22.2 03/28/2013    PROTIME 22.0 02/23/2013    INR 2.50 07/23/2013    INR 2.01 03/28/2013    INR 1.99 02/23/2013     Lab Results   Component Value Date    CHOL 153 10/23/2020    HDL 60 10/23/2020    HDL 41 03/27/2011    TRIG 157 10/23/2020       Diagnostic and imaging results reviewed. ECG: Irregular rate and rhythm. No murmur/gallop/regurgitation. Echo: 10/12/2020  Study Conclusions     - Left ventricle: The cavity size is mildly dilated. Wall thickness     is normal. Systolic function was mildly reduced. The calculated     ejection fraction was in the range of 47% to 51%. Wall motion was     normal; there were no regional wall motion abnormalities. Doppler     parameters are consistent with abnormal left ventricular     relaxation (grade 1 diastolic dysfunction). The stroke volume is     87ml. The stroke index is 39ml/m^2. The global longitudinal   Melba Avitia was -12%. - Aortic valve: The mean systolic gradient is 3mm Hg. The peak     systolic gradient is 5mm Hg. The valve area by the velocity-time     integral method is 3.5cm^2. The valve area index by the     velocity-time integral method is 1.56cm^2/m^2.  The valve area by     the mean velocity method is fibrillation with RVR. Problem List:  1. Paroxysmal atrial fibrillation ([id number] score 1). 2. Non-ischemic cardiomyopathy with recovered LVEF. 3. Hypertension. Assessment and Plan:  1. Paroxysmal atrial fibrillation ([id number] score 1). Patient is a pleasant 64year old male with a medical history significant for symptomatic atrial fibrillation status post ablation (2010 and 2011), obstructive sleep apnea, hypertension, and recovered non-ischemic cardiomyopathy who presents from home with symptomatic atrial fibrillation with RVR. We discussed options including antiarrhythmics, ablation and rate control. He is leaning towards ablation given cresendo nature of his episodes. In the interim, as he has been compliant with his [address] (apixaban) for greater than 4 weeks, we will plan on cardioversion and initiation of dronedarone (discussed amiodarone, dofetilide, and dronedarone). He will follow up with Dr. Andre Nicole and Dr. Pedro Luis Platt as out patient (patient arranging currently). - Goal K > 4.  - Start dronedarone 400 mg BID. - Stop propafenone. - Cardioversion.  - Continue carvedilol 12.5 mg BID.  - Patient ok for discharge. 2. Non-ischemic cardiomyopathy with recovered LVEF. No overt sign of heart failure. - Continue GDMT and follow up with Dr. Andre Nicole. .    3. Hypertension. Stable. Thank you for allowing me to participate in the care of Harry Cannon III . If you have any questions/comments, please do not hesitate to contact us.     Sherryle Prophet, MD  Cardiac Electrophysiology  [address] [facility]  [phone] [address] [place] [address]

## 2021-09-15 NOTE — PROGRESS NOTES
Pt d/c'd home. Removed  IV and stopped bleeding. Catheter intact. Pt tolerated well. No redness noted at site. Notified [facility] and removed tele box. Reviewed d/c instructions, home meds, and  f/u information utilizing teach-back method. Patient verbalized understanding. Pt wheeled down for discharge.

## 2021-09-15 NOTE — DISCHARGE SUMMARY
Hospital Medicine Discharge Summary    Patient ID: Grace Rinne III      Patient's PCP: OFE Alan CNP    Admit Date: 9/14/2021     Discharge Date: 9/15/2021      Admitting Physician: Lalit Gardner MD     Discharge Physician: Lalit Gardner MD     Discharge Diagnoses: Active Hospital Problems    Diagnosis     Atrial fibrillation with RVR ([facility]) [I48.91]     V-tach ([address] [I47.2]    Nonischemic cardiomyopathy  Hypertension  Hypokalemia    The patient was seen and examined on day of discharge and this discharge summary is in conjunction with any daily progress note from day of discharge. HPI on 9/14/2021:   64 y.o. male, with PMH of HTN, cardiomyopathy, A. fib, and ART, who presented to [facility] with headache, dizziness, and fatigue. History was obtained from the patient and review of the EMR. The patient states that he has been having the symptoms for the past several days. He decided to come into the ED for further evaluation as his symptoms progressively worsened. He was concerned that there was something going on in his brain. On arrival to the ED, the patient was noted to be in atrial fibrillation with rapid ventricular rate. The patient does follow [facility] EP group for this and was told in the past to take propafenone as needed for his atrial fibrillation. He was given a dose of this in the ED without resolution of his symptoms. The patient was also noted to have runs of vtach after this as well. He was subsequently started on diltiazem drip per ED physician.   He will be admitted for further evaluation and cardiology evaluation.     The patient states that he follows with Dr. Kayleigh Gates (EP) and Dr. Geeta Rodriguez (cardiology) from [facility]. States he had ablation done last about 10 years ago and wanted to discuss another ablation with his cardiologist. He follows very closely with his cardiologist and is very knowledgeable regarding his history with atrial fibrillation. Potassium was 3.2 on admission. Hospital Course:    He was admitted with TANIA corbin with RVR with NSVT. Cardiology was consulted. Underwent successful electrical cardioversion to sinus rhythm. Started on Multaq. His electrolytes were replaced. Oral supplementation started for borderline hypokalemia as patient on HCTZ which may be contributing to his initial hypokalemia. Patient was asymptomatic. His home Coreg and Eliquis doses were continued with no changes. Patient is to follow-up with his outpatient cardiologist and PCP in about a week. He is supposed to get a repeat BMP on his appointment which patient of voiced understanding. He was discharged home in stable condition. Physical Exam Performed:     /84   Pulse 63   Temp 97.8 °F (36.6 °C) (Oral)   Resp 16   Ht 6' 3\" (1.905 m)   Wt 224 lb 1.6 oz (101.7 kg)   SpO2 97%   BMI 28.01 kg/m²       General appearance:  No apparent distress, appears stated age and cooperative. HEENT:  Normal cephalic, atraumatic without obvious deformity. Pupils equal, round, and reactive to light. Extra ocular muscles intact. Conjunctivae/corneas clear. Neck: Supple, with full range of motion. No jugular venous distention. Trachea midline. Respiratory:  Normal respiratory effort. Clear to auscultation, bilaterally without Rales/Wheezes/Rhonchi. Cardiovascular:  Regular rate and rhythm with normal S1/S2 without murmurs, rubs or gallops. Abdomen: Soft, non-tender, non-distended with normal bowel sounds. Musculoskeletal:  No clubbing, cyanosis or edema bilaterally. Skin: warm and dry  Neurologic:  Neurovascularly intact without any focal sensory/motor deficits. Cranial nerves: II-XII intact, grossly non-focal.  Psychiatric:  Alert and oriented, thought content appropriate, normal insight        Labs:  For convenience and continuity at follow-up the following most recent labs are provided:      CBC:    Lab Results   Component Value Date    WBC 10.1 09/14/2021    HGB 15.4 09/14/2021    HCT 43.6 09/14/2021     09/14/2021       Renal:    Lab Results   Component Value Date     09/15/2021    K 3.5 09/15/2021     09/15/2021    CO2 30 09/15/2021    BUN 16 09/15/2021    CREATININE 1.0 09/15/2021    CALCIUM 9.3 09/15/2021         Significant Diagnostic Studies    Radiology:   CT HEAD WO CONTRAST   Final Result   No acute intracranial abnormality. Subtle small-vessel ischemic change, similar to prior         XR CHEST PORTABLE   Final Result   No acute cardiopulmonary process. Consults:     IP CONSULT TO CARDIOLOGY  IP CONSULT TO CARDIOLOGY  IP CONSULT TO HOSPITALIST  IP CONSULT TO CARDIOLOGY    Disposition:  Home     Condition at Discharge: Stable    Discharge Instructions/Follow-up:      - Follow up with PCP in one week. You will need your potassium level checked on your appointment  - Follow up with your outpatient cardiologist       Code Status:  Prior     Activity: activity as tolerated    Diet: cardiac diet      Discharge Medications:     Discharge Medication List as of 9/15/2021  1:53 PM           Details   potassium chloride (KLOR-CON M) 20 MEQ extended release tablet Take 1 tablet by mouth daily, Disp-30 tablet, R-0Normal      dronedarone hcl (MULTAQ) 400 MG TABS Take 1 tablet by mouth 2 times daily (with meals), Disp-60 tablet, R-1Normal              Details   amLODIPine (NORVASC) 10 MG tablet Take 10 mg by mouth dailyHistorical Med      hydroCHLOROthiazide (HYDRODIURIL) 25 MG tablet Historical Med      apixaban (ELIQUIS) 5 MG TABS tablet TAKE ONE TABLET BY MOUTH TWICE A DAYHistorical Med      carvedilol (COREG) 12.5 MG tablet Take 12.5 mg by mouth 2 times daily (with meals). minocycline (MINOCIN;DYNACIN) 100 MG capsule Take 100 mg by mouth daily. lisinopril (PRINIVIL;ZESTRIL) 40 MG tablet Take 40 mg by mouth daily.              Time Spent on discharge is more than 30 minutes in the examination, evaluation, counseling and review of medications and discharge plan. Signed:    Prasanna Merino MD   9/16/2021      Thank you OFE Beatty CNP for the opportunity to be involved in this patient's care. If you have any questions or concerns please feel free to contact me at [phone].

## 2021-09-15 NOTE — ED NOTES
Called cards @ 0345   Re: EP please, afib rvr  Dr. Jacoby Messer @ [address] [facility]  09/15/21 8932

## 2021-09-16 ENCOUNTER — CARE COORDINATION (OUTPATIENT)
Dept: CASE MANAGEMENT | Age: 61
End: 2021-09-16

## 2021-09-16 NOTE — CARE COORDINATION
[facility] Transitions Initial Follow Up Call    Call within 2 business days of discharge: Yes    Patient: Sharath Akers III Patient : 1960   MRN: [id number]  Reason for Admission: A-fib with rvr s/p cardioversion   Discharge Date: 9/15/21 RARS: Readmission Risk Score: 12      Last Discharge [facility]       Complaint Diagnosis Description Type Department Provider    21 Headache; Dizziness; Fatigue Atrial fibrillation with rapid ventricular response ([facility] [address] . .. ED to Hosp-Admission (Discharged) (ADMITTED) SUNITHA B3 Tara Diaz MD; Paulo Mendez ... Attempted to reach patient via phone for initial post hospital transition call. VM left stating purpose of call along with my contact information requesting a return call.           Care Transitions 24 Hour Call    Care Transitions Interventions         Follow Up  Future Appointments   Date Time Provider Leno Serrato   10/19/2021  9:00 AM Jessica Aaron, [address] [facility] BSN, RN, [facility]  Care Transition Nurse  [phone] mobile

## 2021-09-17 ENCOUNTER — CARE COORDINATION (OUTPATIENT)
Dept: CASE MANAGEMENT | Age: 61
End: 2021-09-17

## 2021-09-17 DIAGNOSIS — I48.91 ATRIAL FIBRILLATION WITH RVR (HCC): Primary | ICD-10-CM

## 2021-09-17 NOTE — CARE COORDINATION
[facility] Transitions Initial Follow Up Call    Call within 2 business days of discharge: Yes    Patient: Dina Haider III Patient : 1960   MRN: [id number]  Reason for Admission: A-fib   Discharge Date: 9/15/21 RARS: Readmission Risk Score: 12      Last Discharge [facility]       Complaint Diagnosis Description Type Department Provider    21 Headache; Dizziness; Fatigue Atrial fibrillation with rapid ventricular response ([address] . .. ED to Hosp-Admission (Discharged) (ADMITTED) [place] [address] Federico Durham MD; Mimi Mosquera ... Spoke with: [address] [place] [address]: [facility]     Transitions of Care Initial Call    Was this an external facility discharge? No Discharge Facility: n/a    Challenges to be reviewed by the provider   Additional needs identified to be addressed with provider: No  none             Method of communication with provider : none      Advance Care Planning:   Does patient have an Advance Directive: not on file. Was this a readmission? No  Patient stated reason for admission: head fog a-fib   Patients top risk factors for readmission: medical condition-.    Care Transition Nurse (CTN) contacted the patient by telephone to perform post hospital discharge assessment. Verified name and  with patient as identifiers. Provided introduction to self, and explanation of the CTN role. CTN reviewed discharge instructions, medical action plan and red flags with patient who verbalized understanding. Patient given an opportunity to ask questions and does not have any further questions or concerns at this time. Were discharge instructions available to patient? Yes. Reviewed appropriate site of care based on symptoms and resources available to patient including: PCP, Specialist and When to call 911. The patient agrees to contact the PCP office for questions related to their healthcare.      Medication reconciliation was performed with patient, who verbalizes understanding of administration of home medications. Advised obtaining a 90-day supply of all daily and as-needed medications. Reviewed and educated patient on any new and changed medications related to discharge diagnosis. CTN provided contact information. Plan for follow-up call in 5-7 days based on severity of symptoms and risk factors. Plan for next call: symptom management-., self management-. and medication management-. Sylvain Herbert Spoke with patient who reported he is doing well today. Patient reported his HR is 68 and reported he is in SR. CTN reviewed medications and patient reported he is taking all as prescribed. Patient reported he has reached out to cardiologist to setup follow up apt. Patient reported he will reach out to his PCP to update as well. Patient to report back to his cardiologist if worsening sob, cp, palpitations, or dizziness. CTN advised patient of use of urgent care or physicians 24 hr access line if assistance is needed after hours.         Care Transitions 24 Hour Call    Do you have any ongoing symptoms?: No  Do you have a copy of your discharge instructions?: Yes  Do you have all of your prescriptions and are they filled?: Yes  Have you been contacted by a [address]?: No  Have you scheduled your follow up appointment?: No  Were you discharged with any Home Care or Post Acute Services: No  Do you feel like you have everything you need to keep you well at home?: Yes  Care Transitions Interventions         Follow Up  Future Appointments   Date Time Provider Leno Serrato   10/19/2021  9:00 AM [address] [facility] [address] BSN, RN, [facility]  Care Transition Nurse  [phone] mobile

## 2021-09-24 ENCOUNTER — CARE COORDINATION (OUTPATIENT)
Dept: CASE MANAGEMENT | Age: 61
End: 2021-09-24

## 2021-09-24 NOTE — CARE COORDINATION
[facility] Transitions Follow Up Call    2021    Patient: Courtney Breath III  Patient : 1960   MRN: [id number]  Reason for Admission: A-fib  Discharge Date: 9/15/21 RARS: Readmission Risk Score: 12         Attempted to reach patient via phone for care transition. VM left stating purpose of call along with my contact information requesting a return call. Care Transitions Subsequent and Final Call    Subsequent and Final Calls  Care Transitions Interventions  Other Interventions:            Follow Up  Future Appointments   Date Time Provider Leno Serrato   10/19/2021  9:00 AM Jessica Byrne, [address]        Lyssa Mcelroy RN

## 2021-10-01 ENCOUNTER — CARE COORDINATION (OUTPATIENT)
Dept: CASE MANAGEMENT | Age: 61
End: 2021-10-01

## 2021-10-01 NOTE — CARE COORDINATION
[facility] Transitions Follow Up Call    10/1/2021    Patient: Konrad Krueger III  Patient : 1960   MRN: <[id number]>  Reason for Admission: A-fib with rvr s/p cardioversion   Discharge Date: 9/15/21 RARS: Readmission Risk Score: 12       Attempted to contact patient for follow up  transition call. Left HIPAA compliant voicemail message to return call. Contact information provided. Will continue to follow up. Care Transitions Subsequent and Final Call    Subsequent and Final Calls  Care Transitions Interventions  Other Interventions:            Follow Up  Future Appointments   Date Time Provider Leno Serrato   10/19/2021  9:00  [address] Brain Collet, LPN

## 2021-10-01 NOTE — CARE COORDINATION
[facility] Transitions Follow Up Call    10/1/2021    Patient: Orvil Code III  Patient : 1960   MRN: [id number]  Reason for Admission: A-fib   Discharge Date: 9/15/21 RARS: Readmission Risk Score: 12         Spoke with: [address] with patient who reported he is doing alright and had apt with his EP doctor through [facility]. Patient reported he changes to his medications and was advised to continue to monitor his symptoms and possible plan for ablation in a year. Patient reported he would like a copy of EKG from [facility] admission and CTN provided patient with medical records number for [facility]. Patient encouraged to also continue to monitor HR and report any elevation with HR or worsening symptoms. Care Transitions Subsequent and Final Call    Subsequent and Final Calls  Have your medications changed?: Yes  Do you have any questions related to your medications?: No  Do you currently have any active services?: No  Do you have any needs or concerns that I can assist you with?: No  Identified Barriers: None  Care Transitions Interventions  Other Interventions:            Follow Up  Future Appointments   Date Time Provider Leno Serrato   10/19/2021  9:00 AM Jessica Puga, [address] Magdalene Prater RN

## 2021-10-08 ENCOUNTER — CARE COORDINATION (OUTPATIENT)
Dept: CASE MANAGEMENT | Age: 61
End: 2021-10-08

## 2021-10-14 ENCOUNTER — CARE COORDINATION (OUTPATIENT)
Dept: CASE MANAGEMENT | Age: 61
End: 2021-10-14

## 2021-10-14 NOTE — CARE COORDINATION
[facility] Transitions Follow Up Call    10/14/2021    Patient: Meche Hurley III  Patient : 1960   MRN: [id number]  Reason for Admission:   Discharge Date: 9/15/21 RARS: Readmission Risk Score: 12         Spoke with: Meche Hurley III    Final attempt made to reach patient for post hospital follow up call. Left a voice message for patient with my contact information and informed of final outreach attempt. Care Transitions Subsequent and Final Call    Subsequent and Final Calls  Care Transitions Interventions  Other Interventions:            Follow Up  Future Appointments   Date Time Provider Leno Serrato   10/19/2021  9:00 AM Jessica Crowell, [address]       Maxi Darling RN

## 2021-10-19 ENCOUNTER — OFFICE VISIT (OUTPATIENT)
Dept: FAMILY MEDICINE CLINIC | Age: 61
End: 2021-10-19
Payer: COMMERCIAL

## 2021-10-19 VITALS
SYSTOLIC BLOOD PRESSURE: 118 MMHG | WEIGHT: 222 LBS | HEIGHT: 74 IN | HEART RATE: 67 BPM | DIASTOLIC BLOOD PRESSURE: 80 MMHG | BODY MASS INDEX: 28.49 KG/M2 | OXYGEN SATURATION: 97 %

## 2021-10-19 DIAGNOSIS — K59.00 CONSTIPATION, UNSPECIFIED CONSTIPATION TYPE: ICD-10-CM

## 2021-10-19 DIAGNOSIS — E87.6 HYPOKALEMIA: ICD-10-CM

## 2021-10-19 DIAGNOSIS — Z13.220 SCREENING FOR HYPERCHOLESTEROLEMIA: ICD-10-CM

## 2021-10-19 DIAGNOSIS — Z00.00 ENCOUNTER FOR WELL ADULT EXAM WITHOUT ABNORMAL FINDINGS: Primary | ICD-10-CM

## 2021-10-19 DIAGNOSIS — Z13.1 SCREENING FOR DIABETES MELLITUS: ICD-10-CM

## 2021-10-19 PROBLEM — I48.3 TYPICAL ATRIAL FLUTTER (HCC): Status: ACTIVE | Noted: 2021-06-08

## 2021-10-19 LAB
ANION GAP SERPL CALCULATED.3IONS-SCNC: 13 MMOL/L (ref 3–16)
BUN BLDV-MCNC: 19 MG/DL (ref 7–20)
CALCIUM SERPL-MCNC: 9.3 MG/DL (ref 8.3–10.6)
CHLORIDE BLD-SCNC: 102 MMOL/L (ref 99–110)
CHOLESTEROL, TOTAL: 139 MG/DL (ref 0–199)
CO2: 27 MMOL/L (ref 21–32)
CREAT SERPL-MCNC: 1.1 MG/DL (ref 0.8–1.3)
GFR AFRICAN AMERICAN: >60
GFR NON-AFRICAN AMERICAN: >60
GLUCOSE BLD-MCNC: 106 MG/DL (ref 70–99)
HDLC SERPL-MCNC: 55 MG/DL (ref 40–60)
LDL CHOLESTEROL CALCULATED: 73 MG/DL
POTASSIUM SERPL-SCNC: 3.7 MMOL/L (ref 3.5–5.1)
SODIUM BLD-SCNC: 142 MMOL/L (ref 136–145)
TRIGL SERPL-MCNC: 54 MG/DL (ref 0–150)
VLDLC SERPL CALC-MCNC: 11 MG/DL

## 2021-10-19 PROCEDURE — 36415 COLL VENOUS BLD VENIPUNCTURE: CPT | Performed by: REGISTERED NURSE

## 2021-10-19 PROCEDURE — 99396 PREV VISIT EST AGE 40-64: CPT | Performed by: REGISTERED NURSE

## 2021-10-19 RX ORDER — PROPAFENONE HYDROCHLORIDE 325 MG/1
325 CAPSULE, EXTENDED RELEASE ORAL EVERY 12 HOURS
COMMUNITY
Start: 2021-09-24

## 2021-10-19 ASSESSMENT — ENCOUNTER SYMPTOMS
CONSTIPATION: 1
DIARRHEA: 0
VOMITING: 0
BLOOD IN STOOL: 0
SHORTNESS OF BREATH: 0
NAUSEA: 0
COUGH: 0
ABDOMINAL PAIN: 0
EYES NEGATIVE: 1

## 2021-10-19 NOTE — PATIENT INSTRUCTIONS
Heart-Healthy Diet   Sodium, Fat, and Cholesterol Controlled Diet       What Is a Heart Healthy Diet? A heart-healthy diet is one that limits sodium , certain types of fat , and cholesterol . This type of diet is recommended for:   · People with any form of cardiovascular disease (eg, coronary heart disease , peripheral vascular disease , previous heart attack , previous stroke )   · People with risk factors for cardiovascular disease, such as high blood pressure , high cholesterol , or diabetes   · Anyone who wants to lower their risk of developing cardiovascular disease   Sodium    Sodium is a mineral found in many foods. In general, most people consume much more sodium than they need. Diets high in sodium can increase blood pressure and lead to edema (water retention). On a heart-healthy diet, you should consume no more than 2,300 mg (milligrams) of sodium per dayabout the amount in one teaspoon of table salt. The foods highest in sodium include table salt (about 50% sodium), processed foods, convenience foods, and preserved foods. Cholesterol    Cholesterol is a fat-like, waxy substance in your blood. Our bodies make some cholesterol. It is also found in animal products, with the highest amounts in fatty meat, egg yolks, whole milk, cheese, shellfish, and organ meats. On a heart-healthy diet, you should limit your cholesterol intake to less than 200 mg per day. It is normal and important to have some cholesterol in your bloodstream. But too much cholesterol can cause plaque to build up within your arteries, which can eventually lead to a heart attack or stroke. The two types of cholesterol that are most commonly referred to are:   · Low-density lipoprotein (LDL) cholesterol  Also known as bad cholesterol, this is the cholesterol that tends to build up along your arteries. Bad cholesterol levels are increased by eating fats that are saturated or hydrogenated.  Optimal level of this cholesterol is less than 100. Over 130 starts to get risky for heart disease. · High-density lipoprotein (HDL) cholesterol  Also known as good cholesterol, this type of cholesterol actually carries cholesterol away from your arteries and may, therefore, help lower your risk of having a heart attack. You want this level to be high (ideally greater than 60). It is a risk to have a level less than 40. You can raise this good cholesterol by eating olive oil, canola oil, avocados, or nuts. Exercise raises this level, too. Fat    Fat is calorie dense and packs a lot of calories into a small amount of food. Even though fats should be limited due to their high calorie content, not all fats are bad. In fact, some fats are quite healthful. Fat can be broken down into four main types.    · The good-for-you fats are:   ¨ Monounsaturated fat  found in oils such as olive and canola, avocados, and nuts and natural nut butters; can decrease cholesterol levels, while keeping levels of HDL cholesterol high   ¨ Polyunsaturated fat  found in oils such as safflower, sunflower, soybean, corn, and sesame; can decrease total cholesterol and LDL cholesterol   ¨ Omega-3 fatty acids  particularly those found in fatty fish (such as salmon, trout, tuna, mackerel, herring, and sardines); can decrease risk of arrhythmias, decrease triglyceride levels, and slightly lower blood pressure   · The fats that you want to limit are:   ¨ Saturated fat  found in animal products, many fast foods, and a few vegetables; increases total blood cholesterol, including LDL levels   § Animal fats that are saturated include: butter, lard, whole-milk dairy products, meat fat, and poultry skin   § Vegetable fats that are saturated include: hydrogenated shortening, palm oil, coconut oil, cocoa butter   ¨ Hydrogenated or trans fat  found in margarine and vegetable shortening, most shelf stable snack foods, and fried foods; increases LDL and decreases HDL     It is generally recommended that you limit your total fat for the day to less than 30% of your total calories. If you follow an 1800-calorie heart healthy diet, for example, this would mean 60 grams of fat or less per day. Saturated fat and trans fat in your diet raises your blood cholesterol the most, much more than dietary cholesterol does. For this reason, on a heart-healthy diet, less than 7% of your calories should come from saturated fat and ideally 0% from trans fat. On an 1800-calorie diet, this translates into less than 14 grams of saturated fat per day, leaving 46 grams of fat to come from mono- and polyunsaturated fats.    Food Choices on a Heart Healthy Diet   Food Category   Foods Recommended   Foods to Avoid   Grains   Breads and rolls without salted tops Most dry and cooked cereals Unsalted crackers and breadsticks Low-sodium or homemade breadcrumbs or stuffing All rice and pastas   Breads, rolls, and crackers with salted tops High-fat baked goods (eg, muffins, donuts, pastries) Quick breads, self-rising flour, and biscuit mixes Regular bread crumbs Instant hot cereals Commercially prepared rice, pasta, or stuffing mixes   Vegetables   Most fresh, frozen, and low-sodium canned vegetables Low-sodium and salt-free vegetable juices Canned vegetables if unsalted or rinsed   Regular canned vegetables and juices, including sauerkraut and pickled vegetables Frozen vegetables with sauces Commercially prepared potato and vegetable mixes   Fruits   Most fresh, frozen, and canned fruits All fruit juices   Fruits processed with salt or sodium   Milk   Nonfat or low-fat (1%) milk Nonfat or low-fat yogurt Cottage cheese, low-fat ricotta, cheeses labeled as low-fat and low-sodium   Whole milk Reduced-fat (2%) milk Malted and chocolate milk Full fat yogurt Most cheeses (unless low-fat and low salt) Buttermilk (no more than 1 cup per week)   Meats and Beans   Lean cuts of fresh or frozen beef, veal, lamb, or pork (look for the word loin) Fresh or frozen poultry without the skin Fresh or frozen fish and some shellfish Egg whites and egg substitutes (Limit whole eggs to three per week) Tofu Nuts or seeds (unsalted, dry-roasted), low-sodium peanut butter Dried peas, beans, and lentils   Any smoked, cured, salted, or canned meat, fish, or poultry (including soares, chipped beef, cold cuts, hot dogs, sausages, sardines, and anchovies) Poultry skins Breaded and/or fried fish or meats Canned peas, beans, and lentils Salted nuts   Fats and Oils   Olive oil and canola oil Low-sodium, low-fat salad dressings and mayonnaise   Butter, margarine, coconut and palm oils, soares fat   Snacks, Sweets, and Condiments   Low-sodium or unsalted versions of broths, soups, soy sauce, and condiments Pepper, herbs, and spices; vinegar, lemon, or lime juice Low-fat frozen desserts (yogurt, sherbet, fruit bars) Sugar, cocoa powder, honey, syrup, jam, and preserves Low-fat, trans-fat free cookies, cakes, and pies Luis Miguel and animal crackers, fig bars, hasmukh snaps   High-fat desserts Broth, soups, gravies, and sauces, made from instant mixes or other high-sodium ingredients Salted snack foods Canned olives Meat tenderizers, seasoning salt, and most flavored vinegars   Beverages   Low-sodium carbonated beverages Tea and coffee in moderation Soy milk   Commercially softened water   Suggestions   · Make whole grains, fruits, and vegetables the base of your diet. · Choose heart-healthy fats such as canola, olive, and flaxseed oil, and foods high in heart-healthy fats, such as nuts, seeds, soybeans, tofu, and fish. · Eat fish at least twice per week; the fish highest in omega-3 fatty acids and lowest in mercury include salmon, herring, mackerel, sardines, and canned chunk light tuna. If you eat fish less than twice per week or have high triglycerides, talk to your doctor about taking fish oil supplements. · Read food labels.    ¨ For products low in fat and cholesterol, look for fat free, low-fat, cholesterol free, saturated fat free, and trans fat freeAlso scan the Nutrition Facts Label, which lists saturated fat, trans fat, and cholesterol amounts. ¨ For products low in sodium, look for sodium free, very low sodium, low sodium, no added salt, and unsalted   · Skip the salt when cooking or at the table; if food needs more flavor, get creative and try out different herbs and spices. Garlic and onion also add substantial flavor to foods. · Trim any visible fat off meat and poultry before cooking, and drain the fat off after jain. · Use cooking methods that require little or no added fat, such as grilling, boiling, baking, poaching, broiling, roasting, steaming, stir-frying, and sauting. · Avoid fast food and convenience food. They tend to be high in saturated and trans fat and have a lot of added salt. · Talk to a registered dietitian for individualized diet advice.       Last Reviewed: March 2011 Serina Olmedo MS, MPH, RD   Updated: 3/29/2011     Flonase Sensimist

## 2021-10-19 NOTE — PROGRESS NOTES
Well Adult Note  Name: Bimal Doran Date: 10/19/2021   MRN: <[id number]> Sex: Male   Age: 64 y.o. Ethnicity: Non- / Non    : 1960 Race: White (non-)      Marianna Blevins III is here for well adult exam.  History:    He is here for a physical. He was recently seen in the ED for his A-fib. He was cardioverted and started on rythmol. He says he may have to have another ablation. He is feeling better since his ED visit. He had an MRI and was seen by ortho. He was told that his \"hips are shot\" and this is likely the cause of his leg numbness. He is currently doing PT. He has been running 10 minutes a day on the treadmill and is walking everyday. He thinks he is having some issues with GI upset. He has been eating better and exercising. He is taking daily fiber and avoiding fast food. Review of Systems   Constitutional: Negative for fatigue and fever. HENT: Negative. Eyes: Negative. Respiratory: Negative for cough and shortness of breath. Cardiovascular: Negative for chest pain and palpitations. Gastrointestinal: Positive for constipation. Negative for abdominal pain, blood in stool, diarrhea, nausea and vomiting. Endocrine: Negative. Genitourinary: Negative. Musculoskeletal: Negative. Skin: Negative. Allergic/Immunologic: Positive for environmental allergies. Neurological: Positive for light-headedness ( Still has the occasional brain fogthinks this could be due to sinuses, is going to follow-up with ENT). Hematological: Negative. Psychiatric/Behavioral: Negative. Allergies   Allergen Reactions    Amoxicillin     Amoxicillin     Penicillins          Prior to Visit Medications    Medication Sig Taking?  Authorizing Provider   propafenone (RYTHMOL SR) 325 MG extended release capsule Take 325 mg by mouth every 12 hours Yes Historical Provider, MD   potassium chloride (KLOR-CON M) 20 MEQ extended release tablet Take 1 tablet by mouth daily Yes Ted Brown MD   amLODIPine (NORVASC) 10 MG tablet Take 10 mg by mouth daily Yes Historical Provider, MD   hydroCHLOROthiazide (HYDRODIURIL) 25 MG tablet  Yes Historical Provider, MD   apixaban (ELIQUIS) 5 MG TABS tablet TAKE ONE TABLET BY MOUTH TWICE A DAY Yes Historical Provider, MD   carvedilol (COREG) 12.5 MG tablet Take 12.5 mg by mouth 2 times daily (with meals). Yes Historical Provider, MD   minocycline (MINOCIN;DYNACIN) 100 MG capsule Take 100 mg by mouth daily. Yes Historical Provider, MD   lisinopril (PRINIVIL;ZESTRIL) 40 MG tablet Take 40 mg by mouth daily. Yes Historical Provider, MD         Past Medical History:   Diagnosis Date    Atrial fibrillation ([facility] [address]     Atrial fibrillation ([facility] [address] [facility]     CAD (coronary artery disease)     ablation[facility]    Chicken pox     Hypertension     Nonischemic cardiomyopathy ([facility] [address] 3/29/2011    ART (obstructive sleep apnea)     Shingles     Sleep apnea     wears a cpap    Wide-complex tachycardia ([facility] [address] 3/29/2011       Past Surgical History:   Procedure Laterality Date    ABLATION OF DYSRHYTHMIC FOCUS      CARDIAC SURGERY      stent    COLONOSCOPY  4/13/2015    Diverticulosis    CYST INCISION AND DRAINAGE      MRSA infection    UPPER GASTROINTESTINAL ENDOSCOPY  8/22/2012         Family History   Problem Relation Age of Onset    Cancer Mother         breast       Social History     Tobacco Use    Smoking status: Never Smoker    Smokeless tobacco: Never Used   Vaping Use    Vaping Use: Never used   Substance Use Topics    Alcohol use: No     Comment: 1/mthh    Drug use: No       Objective   /80   Pulse 67   Ht 6' 1.5\" (1.867 m)   Wt 222 lb (100.7 kg)   SpO2 97%   BMI 28.89 kg/m²   Wt Readings from Last 3 Encounters:   10/19/21 222 lb (100.7 kg)   09/15/21 224 lb 1.6 oz (101.7 kg)   09/08/21 228 lb (103.4 kg)       Physical Exam  Vitals reviewed. Constitutional:       General: He is not in acute distress.      Appearance: Normal appearance. He is normal weight. HENT:      Head: Normocephalic and atraumatic. Right Ear: Tympanic membrane, ear canal and external ear normal. There is no impacted cerumen. Left Ear: Tympanic membrane, ear canal and external ear normal. There is no impacted cerumen. Nose: Nose normal. No congestion or rhinorrhea. Mouth/Throat:      Mouth: Mucous membranes are moist.      Pharynx: Oropharynx is clear. No oropharyngeal exudate or posterior oropharyngeal erythema. Eyes:      General:         Right eye: No discharge. Left eye: No discharge. Extraocular Movements: Extraocular movements intact. Conjunctiva/sclera: Conjunctivae normal.      Pupils: Pupils are equal, round, and reactive to light. Cardiovascular:      Rate and Rhythm: Normal rate and regular rhythm. Pulses: Normal pulses. Heart sounds: Normal heart sounds. No murmur heard. No friction rub. No gallop. Pulmonary:      Effort: Pulmonary effort is normal.      Breath sounds: Normal breath sounds. No stridor. No wheezing, rhonchi or rales. Abdominal:      General: Abdomen is flat. Bowel sounds are normal. There is no distension. Palpations: Abdomen is soft. There is no mass. Tenderness: There is no abdominal tenderness. There is no guarding or rebound. Hernia: No hernia is present. Musculoskeletal:         General: Normal range of motion. Cervical back: Normal range of motion and neck supple. No tenderness. Right lower leg: No edema. Left lower leg: No edema. Lymphadenopathy:      Cervical: No cervical adenopathy. Skin:     General: Skin is warm and dry. Capillary Refill: Capillary refill takes less than 2 seconds. Coloration: Skin is not jaundiced. Findings: No erythema. Neurological:      General: No focal deficit present. Mental Status: He is alert and oriented to person, place, and time.    Psychiatric:         Mood and Affect: Mood normal. screen  Discontinued     Recommendations for Preventive Services Due: see orders and patient instructions/AVS.  .

## 2021-10-20 LAB
ESTIMATED AVERAGE GLUCOSE: 105.4 MG/DL
HBA1C MFR BLD: 5.3 %

## 2022-02-22 ENCOUNTER — OFFICE VISIT (OUTPATIENT)
Dept: FAMILY MEDICINE CLINIC | Age: 62
End: 2022-02-22
Payer: COMMERCIAL

## 2022-02-22 VITALS
SYSTOLIC BLOOD PRESSURE: 118 MMHG | BODY MASS INDEX: 29.82 KG/M2 | OXYGEN SATURATION: 99 % | DIASTOLIC BLOOD PRESSURE: 60 MMHG | HEART RATE: 78 BPM | HEIGHT: 74 IN | WEIGHT: 232.4 LBS

## 2022-02-22 DIAGNOSIS — N41.3 PROSTATOCYSTITIS: Primary | ICD-10-CM

## 2022-02-22 LAB
BILIRUBIN URINE: NEGATIVE
BLOOD, URINE: NEGATIVE
CLARITY: CLEAR
COLOR: YELLOW
GLUCOSE URINE: NEGATIVE MG/DL
KETONES, URINE: NEGATIVE MG/DL
LEUKOCYTE ESTERASE, URINE: NEGATIVE
MICROSCOPIC EXAMINATION: NORMAL
NITRITE, URINE: NEGATIVE
PH UA: 7 (ref 5–8)
PROTEIN UA: NEGATIVE MG/DL
SPECIFIC GRAVITY UA: 1.01 (ref 1–1.03)
URINE TYPE: NORMAL
UROBILINOGEN, URINE: 0.2 E.U./DL

## 2022-02-22 PROCEDURE — 99213 OFFICE O/P EST LOW 20 MIN: CPT | Performed by: REGISTERED NURSE

## 2022-02-22 PROCEDURE — 81003 URINALYSIS AUTO W/O SCOPE: CPT | Performed by: REGISTERED NURSE

## 2022-02-22 RX ORDER — SULFAMETHOXAZOLE AND TRIMETHOPRIM 800; 160 MG/1; MG/1
1 TABLET ORAL 2 TIMES DAILY
Qty: 42 TABLET | Refills: 0 | Status: SHIPPED | OUTPATIENT
Start: 2022-02-22 | End: 2022-03-15

## 2022-02-22 RX ORDER — LEVOCETIRIZINE DIHYDROCHLORIDE 5 MG/1
5 TABLET, FILM COATED ORAL DAILY
COMMUNITY
Start: 2021-11-02

## 2022-02-22 ASSESSMENT — ENCOUNTER SYMPTOMS
CONSTIPATION: 0
DIARRHEA: 0
RESPIRATORY NEGATIVE: 1
ABDOMINAL PAIN: 0
NAUSEA: 0

## 2022-02-22 NOTE — PATIENT INSTRUCTIONS
Patient Education        Prostatitis: Care Instructions  Overview     Prostatitis is a painful condition caused by inflammation or infection of the prostate. The prostate is a small organ that produces most of the fluid in semen. It lies just below the bladder. It surrounds the urethra, the tube that carries urine through the penis and out of the body. Prostatitis is sometimes caused by bacteria. But often the cause isn't known. Prostatitis caused by bacteria is usually treated with self-care and antibiotics. Follow-up care is a key part of your treatment and safety. Be sure to make and go to all appointments, and call your doctor if you are having problems. It's also a good idea to know your test results and keep a list of the medicines you take. How can you care for yourself at home? · If your doctor prescribed antibiotics, take them as directed. Do not stop taking them just because you feel better. You need to take the full course of antibiotics. · Take an over-the-counter pain medicine, such as acetaminophen (Tylenol), ibuprofen (Advil, Motrin), or naproxen (Aleve). Be safe with medicines. Read and follow all instructions on the label. · Take warm baths to help soothe pain. · Straining to pass stools can hurt when your prostate is inflamed. Avoid constipation. ? Include fruits, vegetables, beans, and whole grains in your diet each day. These foods are high in fiber. ? Drink plenty of fluids. If you have kidney, heart, or liver disease and have to limit fluids, talk with your doctor before you increase the amount of fluids you drink. ? Take a fiber supplement, such as Citrucel or Metamucil, every day if needed. Read and follow all instructions on the label. ? Schedule time each day for a bowel movement. Having a daily routine may help. Take your time and do not strain when having a bowel movement. · Avoid alcohol, caffeine, and spicy or acidic foods, especially if they make your symptoms worse. Acidic foods include tomato-based products and citrus fruits or juices. When should you call for help? Call your doctor now or seek immediate medical care if:    · You have symptoms of a urinary tract infection. These may include:  ? Pain or burning when you urinate. ? A frequent need to urinate without being able to pass much urine. ? Pain in the flank, which is just below the rib cage and above the waist on either side of the back. ? Blood in your urine. ? A fever. Watch closely for changes in your health, and be sure to contact your doctor if:    · You cannot empty your bladder completely.     · You do not get better as expected. Where can you learn more? Go to [email or web address] and sign in to your [facility] account. Enter Z344 in the [employer] box to learn more about \"Prostatitis: Care Instructions. \"     If you do not have an account, please click on the \"Sign Up Now\" link. Current as of: February 10, 2021               Content Version: 13.1  © 2006-2021 Healthwise, Incorporated. Care instructions adapted under license by [facility] ([facility]). If you have questions about a medical condition or this instruction, always ask your healthcare professional. [place] [address] any warranty or liability for your use of this information.          Follow up with urology

## 2022-02-22 NOTE — PROGRESS NOTES
Patient: Gege Moon is a 64 y.o. male who presents today with the following Chief Complaint(s):  Chief Complaint   Patient presents with    Urinary Tract Infection     Struggling to urinate/weak urine stream       Assessment/Plan:  1. Prostatocystitis  Start antibiotic as prescribed. Follow up with Urology. Drink plenty of fluids. - sulfamethoxazole-trimethoprim (BACTRIM DS;SEPTRA DS) 800-160 MG per tablet; Take 1 tablet by mouth 2 times daily for 21 days  Dispense: 42 tablet; Refill: 0  - Urinalysis  - Culture, Urine      Return if symptoms worsen or fail to improve. HPI:     He is here for issues with urination. A week ago he started having trouble emptying his bladder. He is having trouble starting his stream and he feels that he has to struggle to start his urine stream. He feel like his rectum is full. He does have a stinging sensation when he starts his urine stream. This has not gotten worse over the past week. No fevers or blood in urine. He says this feels like when he had prostatitis last year. Current Outpatient Medications   Medication Sig Dispense Refill    levocetirizine (XYZAL) 5 MG tablet Take 5 mg by mouth daily      sulfamethoxazole-trimethoprim (BACTRIM DS;SEPTRA DS) 800-160 MG per tablet Take 1 tablet by mouth 2 times daily for 21 days 42 tablet 0    propafenone (RYTHMOL SR) 325 MG extended release capsule Take 325 mg by mouth every 12 hours      potassium chloride (KLOR-CON M) 20 MEQ extended release tablet Take 1 tablet by mouth daily 30 tablet 0    amLODIPine (NORVASC) 10 MG tablet Take 10 mg by mouth daily      hydroCHLOROthiazide (HYDRODIURIL) 25 MG tablet       apixaban (ELIQUIS) 5 MG TABS tablet TAKE ONE TABLET BY MOUTH TWICE A DAY      carvedilol (COREG) 12.5 MG tablet Take 12.5 mg by mouth 2 times daily (with meals).  minocycline (MINOCIN;DYNACIN) 100 MG capsule Take 100 mg by mouth daily.       lisinopril (PRINIVIL;ZESTRIL) 40 MG tablet Take 40 mg by mouth daily. No current facility-administered medications for this visit. Review of Systems   Constitutional: Negative for fever. Respiratory: Negative. Cardiovascular: Negative. Gastrointestinal: Negative for abdominal pain, constipation, diarrhea and nausea. Genitourinary: Positive for difficulty urinating, dysuria, frequency and urgency. Negative for decreased urine volume, flank pain, hematuria, penile discharge, penile pain, penile swelling, scrotal swelling and testicular pain. Musculoskeletal: Negative. Neurological: Negative. Vitals:    02/22/22 1034   BP: 118/60   Pulse: 78   SpO2: 99%   Weight: 232 lb 6.4 oz (105.4 kg)   Height: 6' 1.5\" (1.867 m)     Physical Exam  Constitutional:       General: He is not in acute distress. Appearance: Normal appearance. He is not ill-appearing. HENT:      Head: Normocephalic and atraumatic. Eyes:      Extraocular Movements: Extraocular movements intact. Conjunctiva/sclera: Conjunctivae normal.      Pupils: Pupils are equal, round, and reactive to light. Cardiovascular:      Pulses: Normal pulses. Pulmonary:      Effort: Pulmonary effort is normal.      Breath sounds: Normal breath sounds. Abdominal:      Palpations: Abdomen is soft. Musculoskeletal:         General: Normal range of motion. Cervical back: Normal range of motion. Skin:     General: Skin is warm and dry. Coloration: Skin is not jaundiced or pale. Findings: No erythema. Neurological:      Mental Status: He is alert. Psychiatric:         Mood and Affect: Mood normal.         Behavior: Behavior normal.         Thought Content: Thought content normal.         Judgment: Judgment normal.            Patient's past medical history, surgical history, family history, medications,  and allergies  were all reviewed and updated as appropriate today.     Patient Active Problem List   Diagnosis    HTN (hypertension)    [facility] [address]    Paroxysmal atrial fibrillation (HCC)    S/P ablation of atrial fibrillation    Ventral hernia    GERD (gastroesophageal reflux disease)    Left atrial dilation    Mitral regurgitation    ART on CPAP    Atrial fibrillation with RVR (HCC)    Typical atrial flutter (HCC)     Past Medical History:   Diagnosis Date    Atrial fibrillation (HCC)     Atrial fibrillation (HCC)     CAD (coronary artery disease)     ablation, at fib    Chicken pox     Hypertension     Nonischemic cardiomyopathy ([facility] [address] 3/29/2011    ART (obstructive sleep apnea)     Shingles     Sleep apnea     wears a cpap    Wide-complex tachycardia ([facility] [address] 3/29/2011      Past Surgical History:   Procedure Laterality Date    ABLATION OF DYSRHYTHMIC FOCUS      CARDIAC SURGERY      stent    COLONOSCOPY  4/13/2015    Diverticulosis    CYST INCISION AND DRAINAGE      MRSA infection    UPPER GASTROINTESTINAL ENDOSCOPY  8/22/2012       Family History   Problem Relation Age of Onset    Cancer Mother         breast      Allergies   Allergen Reactions    Amoxicillin     Amoxicillin     Penicillins        This chart was generated using the [address] [place] [address] dictation system. I created this record but it may contain dictation errors due to the limitation of the software.

## 2022-02-23 LAB — URINE CULTURE, ROUTINE: NORMAL

## 2022-02-25 ENCOUNTER — PATIENT MESSAGE (OUTPATIENT)
Dept: FAMILY MEDICINE CLINIC | Age: 62
End: 2022-02-25

## 2022-02-25 NOTE — TELEPHONE ENCOUNTER
From: Hunter Garibay III  To: Fartun Chavira  Sent: 2/25/2022 3:58 PM EST  Subject: Test Result    Jessica,     Thanks for seeing me on Tuesday, I saw the results and one of the nurses left me a message. Did I have a UTI? I have scheduled an appointment with my Doctor next week. Thanks again.     Prasanna Doe

## 2022-02-25 NOTE — TELEPHONE ENCOUNTER
Please let him know that UC was negative for any growth. However, this does not rule out prostatitis. Continue antibiotics and follow up with urology as dicussed.

## 2022-04-27 ENCOUNTER — CARE COORDINATION (OUTPATIENT)
Dept: CARE COORDINATION | Age: 62
End: 2022-04-27

## 2022-04-27 NOTE — CARE COORDINATION
ACM attempted to contact patient for enrollment. Patient UTR. VM containing ACM contact information left. Will follow up at a later date.

## 2022-05-17 ENCOUNTER — CARE COORDINATION (OUTPATIENT)
Dept: CARE COORDINATION | Age: 62
End: 2022-05-17

## 2022-05-17 NOTE — CARE COORDINATION
ACM received return call from patient, patient was introduced to [address] [place] [address] but declined. No further follow up at this time.

## 2023-10-23 ASSESSMENT — PATIENT HEALTH QUESTIONNAIRE - PHQ9
1. LITTLE INTEREST OR PLEASURE IN DOING THINGS: NOT AT ALL
2. FEELING DOWN, DEPRESSED OR HOPELESS: NOT AT ALL
SUM OF ALL RESPONSES TO PHQ9 QUESTIONS 1 & 2: 0
SUM OF ALL RESPONSES TO PHQ9 QUESTIONS 1 & 2: 0
SUM OF ALL RESPONSES TO PHQ QUESTIONS 1-9: 0
2. FEELING DOWN, DEPRESSED OR HOPELESS: 0
SUM OF ALL RESPONSES TO PHQ QUESTIONS 1-9: 0
1. LITTLE INTEREST OR PLEASURE IN DOING THINGS: 0

## 2023-10-26 ENCOUNTER — OFFICE VISIT (OUTPATIENT)
Dept: FAMILY MEDICINE CLINIC | Age: 63
End: 2023-10-26
Payer: COMMERCIAL

## 2023-10-26 VITALS
WEIGHT: 233 LBS | RESPIRATION RATE: 16 BRPM | BODY MASS INDEX: 29.9 KG/M2 | HEIGHT: 74 IN | SYSTOLIC BLOOD PRESSURE: 110 MMHG | OXYGEN SATURATION: 98 % | HEART RATE: 68 BPM | DIASTOLIC BLOOD PRESSURE: 78 MMHG

## 2023-10-26 DIAGNOSIS — N28.1 RENAL CYST: ICD-10-CM

## 2023-10-26 DIAGNOSIS — R79.89 ELEVATED SERUM CREATININE: Primary | ICD-10-CM

## 2023-10-26 DIAGNOSIS — Z23 NEED FOR INFLUENZA VACCINATION: ICD-10-CM

## 2023-10-26 DIAGNOSIS — Z87.19 HISTORY OF DIVERTICULITIS: ICD-10-CM

## 2023-10-26 DIAGNOSIS — E87.6 HYPOKALEMIA: ICD-10-CM

## 2023-10-26 DIAGNOSIS — R79.89 ELEVATED SERUM CREATININE: ICD-10-CM

## 2023-10-26 PROCEDURE — 90471 IMMUNIZATION ADMIN: CPT | Performed by: REGISTERED NURSE

## 2023-10-26 PROCEDURE — 90674 CCIIV4 VAC NO PRSV 0.5 ML IM: CPT | Performed by: REGISTERED NURSE

## 2023-10-26 PROCEDURE — 3078F DIAST BP <80 MM HG: CPT | Performed by: REGISTERED NURSE

## 2023-10-26 PROCEDURE — 99214 OFFICE O/P EST MOD 30 MIN: CPT | Performed by: REGISTERED NURSE

## 2023-10-26 PROCEDURE — 3074F SYST BP LT 130 MM HG: CPT | Performed by: REGISTERED NURSE

## 2023-10-26 ASSESSMENT — ENCOUNTER SYMPTOMS
SHORTNESS OF BREATH: 0
GASTROINTESTINAL NEGATIVE: 1
EYES NEGATIVE: 1

## 2023-10-26 NOTE — PROGRESS NOTES
Well Adult Note  Name: Kev Pierson Date: 10/26/2023   MRN: [id number] Sex: Male   Age: 61 y.o. Ethnicity: Non- / Non    : 1960 Race: White (non-)      Jaime Ochoa III is here for well adult exam.  History:    He is here for a physical exam and would like to discuss his recent diagnosis of diverticulitis while he was in [place]. His potassium was low and he was given potassium replacement. He felt better initially but had some worsening symptoms and was seen again in the ED in [place]. He had some bruising on his right side. A repeat CT scan was completed along with blood work. CT and lab work was unremarkable. He has a history of a cyst on his kidney. In 2019 the renal cyst was 9 mm. No follow-up was recommended. He had a repeat CT while in [place] and says that the cyst had enlarged to 1.2 cm. He is not having any symptoms or concerns today but would like to ensure that he gets appropriate follow-up for renal cyst.    Review of Systems   Constitutional:  Negative for chills, diaphoresis, fatigue and fever. HENT: Negative. Eyes: Negative. Respiratory:  Negative for shortness of breath. Cardiovascular:  Negative for chest pain and palpitations. Gastrointestinal: Negative. Genitourinary: Negative. Musculoskeletal: Negative. Skin: Negative. Neurological:  Negative for dizziness, light-headedness and headaches. Psychiatric/Behavioral: Negative. Allergies   Allergen Reactions    Amoxicillin     Amoxicillin     Penicillins          Prior to Visit Medications    Medication Sig Taking? Authorizing Provider   propafenone (RYTHMOL SR) 325 MG extended release capsule Take 1 capsule by mouth in the morning and 1 capsule in the evening.  Yes Saloni Zhang MD   amLODIPine (NORVASC) 10 MG tablet Take 1 tablet by mouth daily Yes Saloni Zhang MD   hydroCHLOROthiazide (HYDRODIURIL) 25 MG tablet  Yes Saloni Zhang MD

## 2023-10-26 NOTE — PATIENT INSTRUCTIONS
Check CMP today. Repeat CT or ultrasound for renal cyst in 6 months. I would like to review CT from [place].

## 2023-10-27 LAB
ALBUMIN SERPL-MCNC: 4.6 G/DL (ref 3.4–5)
ALBUMIN/GLOB SERPL: 2.4 {RATIO} (ref 1.1–2.2)
ALP SERPL-CCNC: 75 U/L (ref 40–129)
ALT SERPL-CCNC: 20 U/L (ref 10–40)
ANION GAP SERPL CALCULATED.3IONS-SCNC: 10 MMOL/L (ref 3–16)
AST SERPL-CCNC: 23 U/L (ref 15–37)
BILIRUB SERPL-MCNC: 1 MG/DL (ref 0–1)
BUN SERPL-MCNC: 21 MG/DL (ref 7–20)
CALCIUM SERPL-MCNC: 9.4 MG/DL (ref 8.3–10.6)
CHLORIDE SERPL-SCNC: 101 MMOL/L (ref 99–110)
CO2 SERPL-SCNC: 29 MMOL/L (ref 21–32)
CREAT SERPL-MCNC: 1 MG/DL (ref 0.8–1.3)
GFR SERPLBLD CREATININE-BSD FMLA CKD-EPI: >60 ML/MIN/{1.73_M2}
GLUCOSE SERPL-MCNC: 93 MG/DL (ref 70–99)
POTASSIUM SERPL-SCNC: 3.7 MMOL/L (ref 3.5–5.1)
PROT SERPL-MCNC: 6.5 G/DL (ref 6.4–8.2)
SODIUM SERPL-SCNC: 140 MMOL/L (ref 136–145)

## 2024-08-31 ENCOUNTER — HOSPITAL ENCOUNTER (EMERGENCY)
Age: 64
Discharge: HOME | End: 2024-08-31
Attending: EMERGENCY MEDICINE
Payer: COMMERCIAL

## 2024-08-31 ENCOUNTER — APPOINTMENT (OUTPATIENT)
Dept: CT IMAGING | Age: 64
End: 2024-08-31
Payer: COMMERCIAL

## 2024-08-31 VITALS
OXYGEN SATURATION: 97 % | WEIGHT: 237.7 LBS | SYSTOLIC BLOOD PRESSURE: 128 MMHG | HEART RATE: 77 BPM | BODY MASS INDEX: 29.56 KG/M2 | RESPIRATION RATE: 16 BRPM | HEIGHT: 75 IN | DIASTOLIC BLOOD PRESSURE: 78 MMHG | TEMPERATURE: 98.4 F

## 2024-08-31 DIAGNOSIS — R10.9 FLANK PAIN: Primary | ICD-10-CM

## 2024-08-31 DIAGNOSIS — R35.0 URINARY FREQUENCY: ICD-10-CM

## 2024-08-31 DIAGNOSIS — N15.9: ICD-10-CM

## 2024-08-31 DIAGNOSIS — E87.6 HYPOKALEMIA: ICD-10-CM

## 2024-08-31 LAB
ALBUMIN SERPL-MCNC: 4.6 G/DL (ref 3.4–5)
ALBUMIN/GLOB SERPL: 2.1 {RATIO} (ref 1.1–2.2)
ALP SERPL-CCNC: 72 U/L (ref 40–129)
ALT SERPL-CCNC: 14 U/L (ref 10–40)
ANION GAP SERPL CALCULATED.3IONS-SCNC: 11 MMOL/L (ref 3–16)
AST SERPL-CCNC: 19 U/L (ref 15–37)
BASOPHILS # BLD: 0.1 K/UL (ref 0–0.2)
BASOPHILS NFR BLD: 0.8 %
BILIRUB SERPL-MCNC: 1 MG/DL (ref 0–1)
BILIRUB UR QL STRIP.AUTO: NEGATIVE
BUN SERPL-MCNC: 15 MG/DL (ref 7–20)
CALCIUM SERPL-MCNC: 9.1 MG/DL (ref 8.3–10.6)
CHLORIDE SERPL-SCNC: 98 MMOL/L (ref 99–110)
CLARITY UR: CLEAR
CO2 SERPL-SCNC: 27 MMOL/L (ref 21–32)
COLOR UR: YELLOW
CREAT SERPL-MCNC: 1.1 MG/DL (ref 0.8–1.3)
DEPRECATED RDW RBC AUTO: 13.6 % (ref 12.4–15.4)
EOSINOPHIL # BLD: 0.3 K/UL (ref 0–0.6)
EOSINOPHIL NFR BLD: 4 %
GFR SERPLBLD CREATININE-BSD FMLA CKD-EPI: 75 ML/MIN/{1.73_M2}
GLUCOSE SERPL-MCNC: 154 MG/DL (ref 70–99)
GLUCOSE UR STRIP.AUTO-MCNC: NEGATIVE MG/DL
HCT VFR BLD AUTO: 43.1 % (ref 40.5–52.5)
HGB BLD-MCNC: 14.6 G/DL (ref 13.5–17.5)
HGB UR QL STRIP.AUTO: NEGATIVE
KETONES UR STRIP.AUTO-MCNC: NEGATIVE MG/DL
LEUKOCYTE ESTERASE UR QL STRIP.AUTO: NEGATIVE
LIPASE SERPL-CCNC: 22 U/L (ref 13–60)
LYMPHOCYTES # BLD: 1.2 K/UL (ref 1–5.1)
LYMPHOCYTES NFR BLD: 16.3 %
MAGNESIUM SERPL-MCNC: 2.1 MG/DL (ref 1.8–2.4)
MCH RBC QN AUTO: 28.6 PG (ref 26–34)
MCHC RBC AUTO-ENTMCNC: 34 G/DL (ref 31–36)
MCV RBC AUTO: 84.2 FL (ref 80–100)
MONOCYTES # BLD: 0.8 K/UL (ref 0–1.3)
MONOCYTES NFR BLD: 9.8 %
NEUTROPHILS # BLD: 5.3 K/UL (ref 1.7–7.7)
NEUTROPHILS NFR BLD: 69.1 %
NITRITE UR QL STRIP.AUTO: NEGATIVE
PH UR STRIP.AUTO: 6.5 [PH] (ref 5–8)
PLATELET # BLD AUTO: 173 K/UL (ref 135–450)
PMV BLD AUTO: 7.9 FL (ref 5–10.5)
POTASSIUM SERPL-SCNC: 3.4 MMOL/L (ref 3.5–5.1)
PROT SERPL-MCNC: 6.8 G/DL (ref 6.4–8.2)
PROT UR STRIP.AUTO-MCNC: NEGATIVE MG/DL
RBC # BLD AUTO: 5.12 M/UL (ref 4.2–5.9)
SODIUM SERPL-SCNC: 136 MMOL/L (ref 136–145)
SP GR UR STRIP.AUTO: 1.01 (ref 1–1.03)
UA COMPLETE W REFLEX CULTURE PNL UR: NORMAL
UA DIPSTICK W REFLEX MICRO PNL UR: NORMAL
URN SPEC COLLECT METH UR: NORMAL
UROBILINOGEN UR STRIP-ACNC: 0.2 E.U./DL
WBC # BLD AUTO: 7.7 K/UL (ref 4–11)

## 2024-08-31 PROCEDURE — 85025 COMPLETE CBC W/AUTO DIFF WBC: CPT

## 2024-08-31 PROCEDURE — 83036 HEMOGLOBIN GLYCOSYLATED A1C: CPT

## 2024-08-31 PROCEDURE — 83690 ASSAY OF LIPASE: CPT

## 2024-08-31 PROCEDURE — 74176 CT ABD & PELVIS W/O CONTRAST: CPT

## 2024-08-31 PROCEDURE — 83735 ASSAY OF MAGNESIUM: CPT

## 2024-08-31 PROCEDURE — 80053 COMPREHEN METABOLIC PANEL: CPT

## 2024-08-31 PROCEDURE — 99284 EMERGENCY DEPT VISIT MOD MDM: CPT

## 2024-08-31 PROCEDURE — 81003 URINALYSIS AUTO W/O SCOPE: CPT

## 2024-08-31 RX ORDER — IBUPROFEN 800 MG/1
800 TABLET, FILM COATED ORAL EVERY 8 HOURS PRN
Qty: 30 TABLET | Refills: 0 | Status: SHIPPED | OUTPATIENT
Start: 2024-08-31

## 2024-08-31 RX ORDER — CYCLOBENZAPRINE HCL 10 MG
10 TABLET ORAL 3 TIMES DAILY PRN
Qty: 30 TABLET | Refills: 0 | Status: SHIPPED | OUTPATIENT
Start: 2024-08-31 | End: 2024-09-10

## 2024-08-31 RX ORDER — POTASSIUM CHLORIDE 1500 MG/1
20 TABLET, EXTENDED RELEASE ORAL 2 TIMES DAILY
Qty: 10 TABLET | Refills: 0 | Status: SHIPPED | OUTPATIENT
Start: 2024-08-31 | End: 2024-09-05

## 2024-08-31 ASSESSMENT — PAIN - FUNCTIONAL ASSESSMENT
PAIN_FUNCTIONAL_ASSESSMENT: 0-10
PAIN_FUNCTIONAL_ASSESSMENT: 0-10

## 2024-08-31 ASSESSMENT — LIFESTYLE VARIABLES
HOW OFTEN DO YOU HAVE A DRINK CONTAINING ALCOHOL: NEVER
HOW MANY STANDARD DRINKS CONTAINING ALCOHOL DO YOU HAVE ON A TYPICAL DAY: PATIENT DOES NOT DRINK

## 2024-08-31 ASSESSMENT — PAIN SCALES - GENERAL
PAINLEVEL_OUTOF10: 3
PAINLEVEL_OUTOF10: 3

## 2024-08-31 NOTE — ED PROVIDER NOTES
[facility]  ED     EMERGENCY DEPARTMENT ENCOUNTER     Location: [facility]  ED  8/31/2024  Note Started: 2:07 PM EDT 8/31/24      Patient Identification  Oral Nunez III is a 64 y.o. male      HPI:Oral Nunez III was evaluated in the Emergency Department for flank pain.  Patient reports he had right-sided flank pains that seem to radiate from the back to the lower abdomen.  Nothing into the legs.  He does have some urinary frequency.  He has an appoint with his urologist scheduled for Tuesday.  He states the pains are worse in certain positions.  In particular he has 1 vehicle he rides and that has a lower seat and it causes more pain and another 1 that has a higher seat and causes less pain.  Patient has no history of diabetes.. Although initial history and physical exam information was obtained by TACOS/NPP/MD/ (who also dictated a record of this visit), I personally saw the patient and performed and made/approved the management plan and take responsibility for the patient management.      PHYSICAL EXAM:  Patient has some right CVA tenderness and muscular right flank tenderness.  No anterior abdominal tenderness.      Patient seen and evaluated.  Relevant records reviewed.  MDM     I had a lengthy discussion with the patient regarding the findings on his lab and imaging tests and additional workup that should likely be undertaken.  He is hypokalemic which we are replacing.  Regarding his urinary frequency his blood glucose today is slightly elevated but he did eat before coming in.  I am sending an A1c to see if that might be elevated.  The last time it was checked was 3 years ago.  I recommended that he keep his appointment with urology for Tuesday given that on CT scan there was some perinephric fat stranding for which I do not have any diagnosis.  His urinalysis is clear as is his renal function on the chemistry.  Advised return here for new or worsening symptoms.  Patient  denies any testicular symptoms.    CLINICAL IMPRESSION  1. Flank pain    2. Urinary frequency    3. Hypokalemia    4. Perinephritis          I, Balaji Perry MD, am the primary clinician of record.       This chart was generated in part by using Dragon Dictation system and may contain errors related to that system including errors in grammar, punctuation, and spelling, as well as words and phrases that may be inappropriate. If there are any questions or concerns please feel free to contact the dictating provider for clarification.     Balaji Perry MD   [employer]        Balaji Perry MD  08/31/24 9848

## 2024-08-31 NOTE — ED PROVIDER NOTES
[facility]  ED  Emergency Department Encounter    Patient Name: Oral Nunez III  MRN: [id number]  YOB: 1960  Date of Evaluation: 8/31/2024  Provider: Jessica Dillon APRN - CNP  Note Started: 9:48 AM EDT 8/31/24    CHIEF COMPLAINT  Flank Pain (Right flank pain, ongoing for 8-9 days, pain getting worse.  Went to urgent care and was told to come  to the ED.  )    SHARED SERVICE VISIT  I have seen and evaluated this patient with my supervising physician, Dr. Perry.     HISTORY OF PRESENT ILLNESS  Oral Nunez III is a 64 y.o. male who presents to the ED for evaluation of a 10-day history of worsening flank pain.  Patient reports right-sided back/flank pain.  Appears to be worsening over the past several days.  Improves with lying down.  Worse with movement.  Radiates into the abdomen mildly.  States that he has had mild diarrhea.  No black or bloody appearance.  No associated nausea or vomiting.  He denies any fevers or chills.  Has had no pain in the testicle.  Has had some mild increased urinary frequency without dysuria.  No foul-smelling or discolored urine.  He has had no associated chest pain or shortness of breath.  No headache, lightheadedness, dizziness or confusion..    No other complaints, modifying factors or associated symptoms.     Nursing notes reviewed were all reviewed and agreed with or any disagreements were addressed in the HPI.    PMH:  Past Medical History:   Diagnosis Date    Atrial fibrillation (HCC)     Atrial fibrillation (HCC)     CAD (coronary artery disease)     ablation, at fib    Chicken pox     Hypertension     Nonischemic cardiomyopathy (HCC) 3/29/2011    ART (obstructive sleep apnea)     Shingles     Sleep apnea     wears a cpap    Wide-complex tachycardia 3/29/2011     Surgical History:  Past Surgical History:   Procedure Laterality Date    ABLATION OF DYSRHYTHMIC FOCUS      CARDIAC SURGERY      stent    COLONOSCOPY  4/13/2015     Care Time:   0 minutes of critical care time spent not including separately billable procedures.    DDx:  I estimate there is LOW risk for ABDOMINAL AORTIC ANEURYSM, CAUDA EQUINA SYNDROME, EPIDURAL MASS LESION, SPINAL STENOSIS, OR HERNIATED DISK CAUSING SEVERE STENOSIS, thus I consider the discharge disposition reasonable. Oral Nunez III and I have also discussed returning to the Emergency Department immediately if new or worsening symptoms occur. We have discussed the symptoms which are most concerning (e.g., saddle anesthesia, urinary or bowel incontinence or retention, changing or worsening pain) that necessitate immediate return.    FINAL IMPRESSION  1. Flank pain    2. Urinary frequency    3. Hypokalemia    4. Perinephritis      Patient referred to:  Jessica Dillon, APRN - CNP  [address] [facility] [address] [phone]    Schedule an appointment as soon as possible for a visit in 2 days    Discharge Medications:   New Prescriptions    CYCLOBENZAPRINE (FLEXERIL) 10 MG TABLET    Take 1 tablet by mouth 3 times daily as needed for Muscle spasms    IBUPROFEN (ADVIL;MOTRIN) 800 MG TABLET    Take 1 tablet by mouth every 8 hours as needed for Pain    POTASSIUM CHLORIDE (KLOR-CON M) 20 MEQ EXTENDED RELEASE TABLET    Take 1 tablet by mouth 2 times daily for 5 days     Discontinued Medications:  Discontinued Medications    POTASSIUM CHLORIDE (KLOR-CON M) 20 MEQ EXTENDED RELEASE TABLET    Take 1 tablet by mouth daily     Risk management discussed and shared decision making had with patient and/or surrogate. All questions were answered. Patient will follow up with PCP in 2 to 3 days for further evaluation/treatment.  All questions answered.  Patient will return to ED for new/worsening symptoms.    DISPOSITION:  Patient was discharged home in stable condition.    (Please note that portions of this note were completed with a voice recognition program.  Efforts were made to edit the dictations but

## 2024-09-01 LAB
EST. AVERAGE GLUCOSE BLD GHB EST-MCNC: 105.4 MG/DL
HBA1C MFR BLD: 5.3 %

## 2024-09-03 ENCOUNTER — CARE COORDINATION (OUTPATIENT)
Dept: CARE COORDINATION | Age: 64
End: 2024-09-03

## 2024-09-03 NOTE — CARE COORDINATION
Ambulatory Care Coordination Note     9/3/2024 3:26 PM     Patient outreach attempt by this ACM today to offer care management services. ACM was unable to reach the patient by telephone today; left voice message requesting a return phone call to this ACM.     ACM: Phyllis Hi, RN     Care Summary Note: E/D visit at [facility] on 8/31/24 for Flank Pain. PMH: HTN. UTR, LVM X1. Care Coordination Program identified att is time, ACM added to Tx team.    PCP/Specialist follow up:       Follow Up:   Plan for next ACM outreach in approximately 1 week to complete:  - outreach attempt to offer care management services.      Phyllis MENDOZA, RN  Respecting Choices® Advanced Steps ACP Facilitator  Ambulatory Care Manager  [facility]  [phone]

## 2024-09-24 ENCOUNTER — CARE COORDINATION (OUTPATIENT)
Dept: CARE COORDINATION | Age: 64
End: 2024-09-24

## 2024-10-21 ENCOUNTER — CARE COORDINATION (OUTPATIENT)
Dept: CARE COORDINATION | Age: 64
End: 2024-10-21

## 2024-10-21 NOTE — CARE COORDINATION
Ambulatory Care Coordination Note     10/21/2024 12:17 PM     Patient outreach attempt by this ACM today to offer care management services. ACM was unable to reach the patient by telephone today; left voice message requesting a return phone call to this ACM.     ACM: Phyllis Hi, RN     Care Summary Note:  E/D visit at [facility] on 8/31/24 for Flank Pain. PMH: HTN. UTR, LVM X3. Care Coordination closed at this time.          Phyllis MENDOZA, RN  Respecting Choices® Advanced Steps ACP Facilitator  Ambulatory Care Manager  [facility]  [phone]

## 2025-01-29 ENCOUNTER — OFFICE VISIT (OUTPATIENT)
Dept: FAMILY MEDICINE CLINIC | Age: 65
End: 2025-01-29
Payer: COMMERCIAL

## 2025-01-29 VITALS
HEART RATE: 64 BPM | BODY MASS INDEX: 28.75 KG/M2 | OXYGEN SATURATION: 99 % | WEIGHT: 230 LBS | SYSTOLIC BLOOD PRESSURE: 124 MMHG | DIASTOLIC BLOOD PRESSURE: 74 MMHG

## 2025-01-29 DIAGNOSIS — M16.11 PRIMARY OSTEOARTHRITIS OF RIGHT HIP: ICD-10-CM

## 2025-01-29 DIAGNOSIS — Z01.818 PREOP EXAMINATION: Primary | ICD-10-CM

## 2025-01-29 DIAGNOSIS — Z01.818 PREOP EXAMINATION: ICD-10-CM

## 2025-01-29 PROBLEM — I48.3 TYPICAL ATRIAL FLUTTER (HCC): Status: RESOLVED | Noted: 2021-06-08 | Resolved: 2025-01-29

## 2025-01-29 LAB
ALBUMIN SERPL-MCNC: 4.7 G/DL (ref 3.4–5)
ALBUMIN/GLOB SERPL: 2.4 {RATIO} (ref 1.1–2.2)
ALP SERPL-CCNC: 71 U/L (ref 40–129)
ALT SERPL-CCNC: 16 U/L (ref 10–40)
ANION GAP SERPL CALCULATED.3IONS-SCNC: 7 MMOL/L (ref 3–16)
AST SERPL-CCNC: 25 U/L (ref 15–37)
BILIRUB SERPL-MCNC: 0.9 MG/DL (ref 0–1)
BUN SERPL-MCNC: 17 MG/DL (ref 7–20)
CALCIUM SERPL-MCNC: 9.4 MG/DL (ref 8.3–10.6)
CHLORIDE SERPL-SCNC: 103 MMOL/L (ref 99–110)
CO2 SERPL-SCNC: 31 MMOL/L (ref 21–32)
CREAT SERPL-MCNC: 1 MG/DL (ref 0.8–1.3)
GFR SERPLBLD CREATININE-BSD FMLA CKD-EPI: 84 ML/MIN/{1.73_M2}
GLUCOSE SERPL-MCNC: 105 MG/DL (ref 70–99)
POTASSIUM SERPL-SCNC: 3.9 MMOL/L (ref 3.5–5.1)
PROT SERPL-MCNC: 6.7 G/DL (ref 6.4–8.2)
SODIUM SERPL-SCNC: 141 MMOL/L (ref 136–145)

## 2025-01-29 PROCEDURE — 99214 OFFICE O/P EST MOD 30 MIN: CPT | Performed by: REGISTERED NURSE

## 2025-01-29 PROCEDURE — 3078F DIAST BP <80 MM HG: CPT | Performed by: REGISTERED NURSE

## 2025-01-29 PROCEDURE — 3074F SYST BP LT 130 MM HG: CPT | Performed by: REGISTERED NURSE

## 2025-01-29 RX ORDER — TRAMADOL HYDROCHLORIDE 50 MG/1
TABLET ORAL
COMMUNITY
Start: 2024-12-16

## 2025-01-29 RX ORDER — TAMSULOSIN HYDROCHLORIDE 0.4 MG/1
0.4 CAPSULE ORAL PRN
COMMUNITY

## 2025-01-29 SDOH — ECONOMIC STABILITY: FOOD INSECURITY: WITHIN THE PAST 12 MONTHS, THE FOOD YOU BOUGHT JUST DIDN'T LAST AND YOU DIDN'T HAVE MONEY TO GET MORE.: NEVER TRUE

## 2025-01-29 SDOH — ECONOMIC STABILITY: FOOD INSECURITY: WITHIN THE PAST 12 MONTHS, YOU WORRIED THAT YOUR FOOD WOULD RUN OUT BEFORE YOU GOT MONEY TO BUY MORE.: NEVER TRUE

## 2025-01-29 ASSESSMENT — PATIENT HEALTH QUESTIONNAIRE - PHQ9
SUM OF ALL RESPONSES TO PHQ QUESTIONS 1-9: 0
2. FEELING DOWN, DEPRESSED OR HOPELESS: NOT AT ALL
1. LITTLE INTEREST OR PLEASURE IN DOING THINGS: NOT AT ALL
SUM OF ALL RESPONSES TO PHQ9 QUESTIONS 1 & 2: 0

## 2025-01-29 NOTE — PROGRESS NOTES
carvedilol (COREG) 12.5 MG tablet Take 1 tablet by mouth 2 times daily (with meals)      lisinopril (PRINIVIL;ZESTRIL) 40 MG tablet Take 1 tablet by mouth daily      potassium chloride (KLOR-CON M) 20 MEQ extended release tablet Take 1 tablet by mouth 2 times daily for 5 days 10 tablet 0     No current facility-administered medications for this visit.       Allergies:  Amoxicillin, Amoxicillin, and Penicillins  History of allergic reaction to anesthesia:  No     Social History     Tobacco Use   Smoking Status Never   Smokeless Tobacco Never   Tobacco Comments    Never been a smoker...     The patient states he drinks 0 per week.    Family History   Problem Relation Age of Onset    Cancer Mother         breast    Arrhythmia Mother     High Blood Pressure Mother     Stroke Father        REVIEW OF SYSTEMS:    CONSTITUTIONAL:  negative  EYES:  negative  HEENT:  positive for chronic nasal congestion  RESPIRATORY:  negative for  dry cough, cough with sputum, and dyspnea  CARDIOVASCULAR:  negative for  chest pain, dyspnea, palpitations  GASTROINTESTINAL:  negative  GENITOURINARY:  negative  INTEGUMENT/BREAST:  negative  HEMATOLOGIC/LYMPHATIC:  negative  ALLERGIC/IMMUNOLOGIC:  negative  ENDOCRINE:  negative  MUSCULOSKELETAL:  positive for chronic right hip arthralgia  NEUROLOGICAL:  negative    PHYSICAL EXAM:      /74   Pulse 64   Wt 104.3 kg (230 lb)   SpO2 99%   BMI 28.75 kg/m²     CONSTITUTIONAL:  awake, alert, cooperative, no apparent distress, and appears stated age    Eyes:  Lids and lashes normal, pupils equal, round and reactive to light, extra ocular muscles intact, sclera clear, conjunctiva normal    Head/ENT:  Normocephalic, without obvious abnormality, atramatic, sinuses nontender on palpation, external ears without lesions, oral pharynx with moist mucus membranes, tonsils without erythema or exudates, gums normal and good dentition.    Neck:  Supple, symmetrical, trachea midline, no adenopathy,